# Patient Record
Sex: FEMALE | Race: WHITE | NOT HISPANIC OR LATINO | URBAN - METROPOLITAN AREA
[De-identification: names, ages, dates, MRNs, and addresses within clinical notes are randomized per-mention and may not be internally consistent; named-entity substitution may affect disease eponyms.]

---

## 2018-05-20 ENCOUNTER — EMERGENCY (EMERGENCY)
Facility: HOSPITAL | Age: 58
LOS: 0 days | Discharge: HOME | End: 2018-05-20
Attending: EMERGENCY MEDICINE | Admitting: EMERGENCY MEDICINE

## 2018-05-20 VITALS
SYSTOLIC BLOOD PRESSURE: 144 MMHG | RESPIRATION RATE: 18 BRPM | OXYGEN SATURATION: 100 % | WEIGHT: 164.91 LBS | TEMPERATURE: 97 F | HEART RATE: 80 BPM | DIASTOLIC BLOOD PRESSURE: 83 MMHG | HEIGHT: 63 IN
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
## 2018-05-20 DIAGNOSIS — Z88.8 ALLERGY STATUS TO OTHER DRUGS, MEDICAMENTS AND BIOLOGICAL SUBSTANCES: ICD-10-CM

## 2018-05-20 DIAGNOSIS — M54.5 LOW BACK PAIN: ICD-10-CM
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
## 2018-05-20 DIAGNOSIS — Z88.5 ALLERGY STATUS TO NARCOTIC AGENT: ICD-10-CM

## 2018-05-20 DIAGNOSIS — Z79.899 OTHER LONG TERM (CURRENT) DRUG THERAPY: ICD-10-CM

## 2018-05-20 DIAGNOSIS — E78.00 PURE HYPERCHOLESTEROLEMIA, UNSPECIFIED: ICD-10-CM

## 2018-05-20 DIAGNOSIS — M54.2 CERVICALGIA: ICD-10-CM
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
## 2018-05-20 RX ORDER — ESZOPICLONE 2 MG/1
1 TABLET, COATED ORAL
Qty: 0 | Refills: 0 | COMMUNITY

## 2018-05-20 RX ORDER — ROSUVASTATIN CALCIUM 5 MG/1
1 TABLET ORAL
Qty: 0 | Refills: 0 | COMMUNITY

## 2018-05-20 RX ORDER — METHOCARBAMOL 500 MG/1
1000 TABLET, FILM COATED ORAL ONCE
Qty: 0 | Refills: 0 | Status: COMPLETED | OUTPATIENT
Start: 2018-05-20 | End: 2018-05-20

## 2018-05-20 RX ORDER — IBUPROFEN 200 MG
1 TABLET ORAL
Qty: 20 | Refills: 0 | OUTPATIENT
Start: 2018-05-20 | End: 2018-05-24

## 2018-05-20 RX ORDER — KETOROLAC TROMETHAMINE 30 MG/ML
30 SYRINGE (ML) INJECTION ONCE
Qty: 0 | Refills: 0 | Status: DISCONTINUED | OUTPATIENT
Start: 2018-05-20 | End: 2018-05-20

## 2018-05-20 RX ORDER — TIZANIDINE 4 MG/1
2 TABLET ORAL
Qty: 30 | Refills: 0 | OUTPATIENT
Start: 2018-05-20 | End: 2018-05-24

## 2018-05-20 RX ORDER — ALPRAZOLAM 0.25 MG
1 TABLET ORAL
Qty: 0 | Refills: 0 | COMMUNITY

## 2018-05-20 RX ADMIN — Medication 30 MILLIGRAM(S): at 19:40

## 2018-05-20 RX ADMIN — METHOCARBAMOL 1000 MILLIGRAM(S): 500 TABLET, FILM COATED ORAL at 19:40
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
## 2018-05-20 NOTE — ED PROVIDER NOTE - PHYSICAL EXAMINATION
VITAL SIGNS: I have reviewed nursing notes and confirm.  CONSTITUTIONAL: Well-developed; well-nourished; in no acute distress.  SKIN: Skin exam is warm and dry, no acute rash.  HEAD: Normocephalic; atraumatic.  EYES: PERRL, EOM intact; conjunctiva and sclera clear.  ENT: No nasal discharge; airway clear.   NECK: Supple. No midline TTP. (+) TTP to right trapezius.   CARD: S1, S2 normal; no murmurs, gallops, or rubs. Regular rate and rhythm.  RESP: No wheezes, rales or rhonchi. Speaking in full sentences.   ABD: Normal bowel sounds; soft; non-distended; non-tender; No rebound or guarding.   BACK: No midline TTP. (+) right lumbar paraspinal TTP.   EXT: Normal ROM. No clubbing, cyanosis or edema.  NEURO: Alert, oriented. Grossly unremarkable. No focal deficits. CN II-XII intact. No dysmetria. Sensation intact throughout. Strength 5/5 throughout. Gait steady.
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
## 2019-05-31 NOTE — ED ADULT TRIAGE NOTE - AS O2 DELIVERY
room air
I have personally seen and examined this patient.  I have fully participated in the care of this patient. I have reviewed all pertinent clinical information, including history, physical exam, plan and the Resident’s note and agree except as noted.

## 2019-07-11 PROBLEM — Z00.00 ENCOUNTER FOR PREVENTIVE HEALTH EXAMINATION: Status: ACTIVE | Noted: 2019-07-11

## 2019-07-15 ENCOUNTER — APPOINTMENT (OUTPATIENT)
Dept: ORTHOPEDIC SURGERY | Facility: CLINIC | Age: 59
End: 2019-07-15
Payer: COMMERCIAL

## 2019-07-15 VITALS
BODY MASS INDEX: 31.89 KG/M2 | HEIGHT: 63 IN | WEIGHT: 180 LBS | SYSTOLIC BLOOD PRESSURE: 135 MMHG | DIASTOLIC BLOOD PRESSURE: 84 MMHG | TEMPERATURE: 98 F | HEART RATE: 80 BPM

## 2019-07-15 DIAGNOSIS — Z78.9 OTHER SPECIFIED HEALTH STATUS: ICD-10-CM

## 2019-07-15 DIAGNOSIS — Z87.39 PERSONAL HISTORY OF OTHER DISEASES OF THE MUSCULOSKELETAL SYSTEM AND CONNECTIVE TISSUE: ICD-10-CM

## 2019-07-15 DIAGNOSIS — Z87.09 PERSONAL HISTORY OF OTHER DISEASES OF THE RESPIRATORY SYSTEM: ICD-10-CM

## 2019-07-15 PROCEDURE — 73565 X-RAY EXAM OF KNEES: CPT

## 2019-07-15 PROCEDURE — 73560 X-RAY EXAM OF KNEE 1 OR 2: CPT | Mod: RT

## 2019-07-15 PROCEDURE — 20610 DRAIN/INJ JOINT/BURSA W/O US: CPT | Mod: LT

## 2019-07-19 NOTE — DISCUSSION/SUMMARY
[de-identified] : At this time, I have strongly recommended that she undergo a repeat course of viscosupplementation for the right and left knee osteoarthritis in hopes to prevent progression to a total knee arthroplasty.

## 2019-07-19 NOTE — PROCEDURE
[de-identified] : Consent: \par At this time, I have recommended an injection to the right and left knee.  The risks and benefits of the procedure were discussed with the patient in detail.  Upon verbal consent of the patient, we proceeded with the injections as noted below.  \par \par Procedure #1:  \par After a sterile prep, the patient underwent an injection of 9 cc of 1% Lidocaine without epinephrine and 1 cc of Kenalog into the right knee.  The patient tolerated the procedure well.  There were no complications. \par \par Procedure #2:  \par After a sterile prep, the patient underwent an injection of 9 cc of 1% Lidocaine without epinephrine and 1 cc of Kenalog into the left knee.  The patient tolerated the procedure well.  There were no complications.

## 2019-07-19 NOTE — PHYSICAL EXAM
[de-identified] : Left Knee: \par Knee: Range of Motion in Degrees	\par 	                  Claimant:	Normal:	\par Flexion Active	  120	                135-degrees	\par Flexion Passive	  120	                135-degrees	\par Extension Active	  10	                0-5-degrees	\par Extension Passive	  10	                0-5-degrees	\par \par No weakness to flexion/extension. No evidence of instability in the AP plane or varus or valgus stress.  Negative  Lachman.  Negative pivot shift.  Negative anterior drawer test.  Negative posterior drawer test.  Negative Arthur.  Negative Apley grind.  No medial or lateral joint line tenderness.  Positive tenderness over the medial and lateral facet of the patella.  Positive patellofemoral crepitations.  No lateral tilting patella.  No patella apprehension.  Positive crepitation in the medial and lateral femoral condyle.  No proximal or distal swelling, edema or tenderness.  No gross motor or sensory deficits. Mild intra-articular swelling.  2+ DP and PT pulses.  Moderate varus deformity.  No valgus malalignment.  Skin is intact.  No rashes, scars or lesions.\par \par Right Knee: \par Knee: Range of Motion in Degrees	\par 	                  Claimant:	Normal:	\par Flexion Active	  120	                135-degrees	\par Flexion Passive	  120	                135-degrees	\par Extension Active	  10	                0-5-degrees	\par Extension Passive	  10	                0-5-degrees	\par \par No weakness to flexion/extension. No evidence of instability in the AP plane or varus or valgus stress.  Negative  Lachman.  Negative pivot shift.  Negative anterior drawer test.  Negative posterior drawer test.  Negative Arthur.  Negative Apley grind.  No medial or lateral joint line tenderness.  Positive tenderness over the medial and lateral facet of the patella.  Positive patellofemoral crepitations.  No lateral tilting patella.  No patella apprehension.  Positive crepitation in the medial and lateral femoral condyle.  No proximal or distal swelling, edema or tenderness.  No gross motor or sensory deficits. Mild intra-articular swelling.  2+ DP and PT pulses.  Moderate varus deformity.  No valgus malalignment.  Skin is intact.  No rashes, scars or lesions.\par  [de-identified] : Ambulating with a slightly antalgic to antalgic gait.  Station:  Normal. [de-identified] : General Appearance:  Well-developed, well-nourished female in no acute distress.\par  [de-identified] : Radiographs, one view of the right knee, one view of the left knee and one view AP standing of bilateral knees, show moderate to early severe degenerative changes.

## 2019-07-19 NOTE — ADDENDUM
[FreeTextEntry1] : This note was written by Yecenia Garay on 07/18/2019 acting as scribe for Pito Puri III, MD\par  Private car
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
## 2019-07-19 NOTE — REVIEW OF SYSTEMS
[Negative] : Heme/Lymph [FreeTextEntry9] : Joint pain; joint stiffness; joint swelling  [FreeTextEntry2] : Recent weight gain

## 2019-11-21 ENCOUNTER — APPOINTMENT (OUTPATIENT)
Age: 59
End: 2019-11-21
Payer: COMMERCIAL

## 2019-11-21 VITALS
SYSTOLIC BLOOD PRESSURE: 156 MMHG | DIASTOLIC BLOOD PRESSURE: 96 MMHG | TEMPERATURE: 98.4 F | HEIGHT: 63 IN | WEIGHT: 180 LBS | HEART RATE: 105 BPM | BODY MASS INDEX: 31.89 KG/M2

## 2019-11-21 PROCEDURE — 73560 X-RAY EXAM OF KNEE 1 OR 2: CPT | Mod: RT

## 2019-11-21 PROCEDURE — 99214 OFFICE O/P EST MOD 30 MIN: CPT | Mod: 25

## 2019-11-21 PROCEDURE — 20610 DRAIN/INJ JOINT/BURSA W/O US: CPT | Mod: 59,RT

## 2019-11-26 NOTE — HISTORY OF PRESENT ILLNESS
[de-identified] : She comes in today with increasing complaints of pain to her knees.  She has been denied for viscosupplementation.

## 2019-11-26 NOTE — DISCUSSION/SUMMARY
[de-identified] : At this time I have recommended ice and elevation for the osteoarthritis, bilateral knees.  She will be reassessed in six to eight weeks.

## 2019-11-26 NOTE — ADDENDUM
[FreeTextEntry1] : This note was written by Marimar Enriquez on 11/26/2019 acting as scribe for Pito Puri III, MD

## 2019-11-26 NOTE — PHYSICAL EXAM
[de-identified] : Right knee:\par Knee: Range of Motion in Degrees	\par 	                  Claimant:	Normal:	\par Flexion Active	  120 	                135-degrees	\par Flexion Passive	  120	                135-degrees	\par Extension Active	  10	                0-5-degrees	\par Extension Passive	  10	                0-5-degrees	\par \par No weakness to flexion/extension. No evidence of instability in the AP plane or varus or valgus stress.  Negative  Lachman.  Negative pivot shift.  Negative anterior drawer test.  Negative posterior drawer test.  Negative Arthur.  Negative Apley grind.  No medial or lateral joint line tenderness.  Positive tenderness over the medial and lateral facet of the patella.  Positive patellofemoral crepitations.  No lateral tilting patella.  No patella apprehension.  Positive crepitation in the medial and lateral femoral condyle.  No proximal or distal swelling, edema or tenderness.  No gross motor or sensory deficits. Mild intra-articular swelling.  2+ DP and PT pulses. Moderate varus deformity.  Skin is intact.  No rashes, scars or lesions. \par \par Left knee:\par Knee: Range of Motion in Degrees	\par 	                  Claimant:	Normal:	\par Flexion Active	  120 	                135-degrees	\par Flexion Passive	  120	                135-degrees	\par Extension Active	  10	                0-5-degrees	\par Extension Passive	  10	                0-5-degrees	\par \par No weakness to flexion/extension. No evidence of instability in the AP plane or varus or valgus stress.  Negative  Lachman.  Negative pivot shift.  Negative anterior drawer test.  Negative posterior drawer test.  Negative Arthur.  Negative Apley grind.  No medial or lateral joint line tenderness.  Positive tenderness over the medial and lateral facet of the patella.  Positive patellofemoral crepitations.  No lateral tilting patella.  No patella apprehension.  Positive crepitation in the medial and lateral femoral condyle.  No proximal or distal swelling, edema or tenderness.  No gross motor or sensory deficits. Mild intra-articular swelling.  2+ DP and PT pulses. Moderate varus deformity.  Skin is intact.  No rashes, scars or lesions.  [de-identified] : Appearance: Well-developed, well-nourished female  in no acute distress.  [de-identified] : Gait: Slightly antalgic to antalgic gait.  Station: Normal  [de-identified] : Radiographs, one view of the right knee, one view of the left knee and one view of bilateral knees, show moderate/early severe osteoarthritis on the left and moderate osteoarthritis on the right.

## 2019-11-26 NOTE — PROCEDURE
[de-identified] : Consent: \par At this time I have recommended an injection to the  right and left knee.  The risks and benefits of the procedure were discussed with the patient in detail.  Upon verbal consent of the patient, we proceeded with the injections as noted below.  \par \par Procedure:  \par After a sterile prep, the patient underwent an injection of 9 cc of 1% Lidocaine without epinephrine and 1 cc of Kenalog into the right and left knee.  The patient tolerated the procedures well.  There were no complications.  \par \par

## 2020-01-01 ENCOUNTER — INPATIENT (INPATIENT)
Facility: HOSPITAL | Age: 60
LOS: 1 days | End: 2020-11-24
Attending: INTERNAL MEDICINE | Admitting: INTERNAL MEDICINE
Payer: COMMERCIAL

## 2020-01-01 ENCOUNTER — APPOINTMENT (OUTPATIENT)
Dept: ORTHOPEDIC SURGERY | Facility: CLINIC | Age: 60
End: 2020-01-01
Payer: COMMERCIAL

## 2020-01-01 VITALS
HEART RATE: 112 BPM | DIASTOLIC BLOOD PRESSURE: 88 MMHG | BODY MASS INDEX: 25.69 KG/M2 | TEMPERATURE: 98.4 F | WEIGHT: 145 LBS | SYSTOLIC BLOOD PRESSURE: 147 MMHG | HEIGHT: 63 IN

## 2020-01-01 VITALS — HEIGHT: 63 IN | BODY MASS INDEX: 31.01 KG/M2 | TEMPERATURE: 98.1 F | WEIGHT: 175 LBS

## 2020-01-01 VITALS — HEART RATE: 44 BPM | WEIGHT: 165.35 LBS | HEIGHT: 63 IN

## 2020-01-01 VITALS — TEMPERATURE: 95 F

## 2020-01-01 DIAGNOSIS — Z66 DO NOT RESUSCITATE: ICD-10-CM

## 2020-01-01 DIAGNOSIS — M62.82 RHABDOMYOLYSIS: ICD-10-CM

## 2020-01-01 DIAGNOSIS — R57.1 HYPOVOLEMIC SHOCK: ICD-10-CM

## 2020-01-01 DIAGNOSIS — G93.1 ANOXIC BRAIN DAMAGE, NOT ELSEWHERE CLASSIFIED: ICD-10-CM

## 2020-01-01 DIAGNOSIS — R57.0 CARDIOGENIC SHOCK: ICD-10-CM

## 2020-01-01 DIAGNOSIS — R65.21 SEVERE SEPSIS WITH SEPTIC SHOCK: ICD-10-CM

## 2020-01-01 DIAGNOSIS — K72.00 ACUTE AND SUBACUTE HEPATIC FAILURE WITHOUT COMA: ICD-10-CM

## 2020-01-01 DIAGNOSIS — E11.10 TYPE 2 DIABETES MELLITUS WITH KETOACIDOSIS WITHOUT COMA: ICD-10-CM

## 2020-01-01 DIAGNOSIS — M17.0 BILATERAL PRIMARY OSTEOARTHRITIS OF KNEE: ICD-10-CM

## 2020-01-01 DIAGNOSIS — N17.0 ACUTE KIDNEY FAILURE WITH TUBULAR NECROSIS: ICD-10-CM

## 2020-01-01 DIAGNOSIS — I21.A1 MYOCARDIAL INFARCTION TYPE 2: ICD-10-CM

## 2020-01-01 DIAGNOSIS — Z88.5 ALLERGY STATUS TO NARCOTIC AGENT: ICD-10-CM

## 2020-01-01 DIAGNOSIS — E86.1 HYPOVOLEMIA: ICD-10-CM

## 2020-01-01 DIAGNOSIS — E87.0 HYPEROSMOLALITY AND HYPERNATREMIA: ICD-10-CM

## 2020-01-01 DIAGNOSIS — I46.8 CARDIAC ARREST DUE TO OTHER UNDERLYING CONDITION: ICD-10-CM

## 2020-01-01 DIAGNOSIS — E78.00 PURE HYPERCHOLESTEROLEMIA, UNSPECIFIED: ICD-10-CM

## 2020-01-01 DIAGNOSIS — I49.01 VENTRICULAR FIBRILLATION: ICD-10-CM

## 2020-01-01 DIAGNOSIS — F41.9 ANXIETY DISORDER, UNSPECIFIED: ICD-10-CM

## 2020-01-01 DIAGNOSIS — R56.9 UNSPECIFIED CONVULSIONS: ICD-10-CM

## 2020-01-01 DIAGNOSIS — E11.649 TYPE 2 DIABETES MELLITUS WITH HYPOGLYCEMIA WITHOUT COMA: ICD-10-CM

## 2020-01-01 DIAGNOSIS — R68.0 HYPOTHERMIA, NOT ASSOCIATED WITH LOW ENVIRONMENTAL TEMPERATURE: ICD-10-CM

## 2020-01-01 DIAGNOSIS — A41.9 SEPSIS, UNSPECIFIED ORGANISM: ICD-10-CM

## 2020-01-01 DIAGNOSIS — E83.39 OTHER DISORDERS OF PHOSPHORUS METABOLISM: ICD-10-CM

## 2020-01-01 DIAGNOSIS — J96.00 ACUTE RESPIRATORY FAILURE, UNSPECIFIED WHETHER WITH HYPOXIA OR HYPERCAPNIA: ICD-10-CM

## 2020-01-01 DIAGNOSIS — E11.00 TYPE 2 DIABETES MELLITUS WITH HYPEROSMOLARITY WITHOUT NONKETOTIC HYPERGLYCEMIC-HYPEROSMOLAR COMA (NKHHC): ICD-10-CM

## 2020-01-01 DIAGNOSIS — K92.2 GASTROINTESTINAL HEMORRHAGE, UNSPECIFIED: ICD-10-CM

## 2020-01-01 LAB
ALBUMIN SERPL ELPH-MCNC: 1.4 G/DL — LOW (ref 3.5–5.2)
ALBUMIN SERPL ELPH-MCNC: 1.9 G/DL — LOW (ref 3.5–5.2)
ALBUMIN SERPL ELPH-MCNC: 2.2 G/DL — LOW (ref 3.5–5.2)
ALBUMIN SERPL ELPH-MCNC: 2.5 G/DL — LOW (ref 3.5–5.2)
ALBUMIN SERPL ELPH-MCNC: 2.6 G/DL — LOW (ref 3.5–5.2)
ALP SERPL-CCNC: 108 U/L — SIGNIFICANT CHANGE UP (ref 30–115)
ALP SERPL-CCNC: 111 U/L — SIGNIFICANT CHANGE UP (ref 30–115)
ALP SERPL-CCNC: 126 U/L — HIGH (ref 30–115)
ALP SERPL-CCNC: 173 U/L — HIGH (ref 30–115)
ALP SERPL-CCNC: 79 U/L — SIGNIFICANT CHANGE UP (ref 30–115)
ALT FLD-CCNC: 239 U/L — HIGH (ref 0–41)
ALT FLD-CCNC: 296 U/L — HIGH (ref 0–41)
ALT FLD-CCNC: 314 U/L — HIGH (ref 0–41)
ALT FLD-CCNC: 466 U/L — HIGH (ref 0–41)
ALT FLD-CCNC: >700 U/L — HIGH (ref 0–41)
ANION GAP SERPL CALC-SCNC: 23 MMOL/L — HIGH (ref 7–14)
ANION GAP SERPL CALC-SCNC: 24 MMOL/L — HIGH (ref 7–14)
ANION GAP SERPL CALC-SCNC: 27 MMOL/L — HIGH (ref 7–14)
ANION GAP SERPL CALC-SCNC: 28 MMOL/L — HIGH (ref 7–14)
ANION GAP SERPL CALC-SCNC: 28 MMOL/L — HIGH (ref 7–14)
ANION GAP SERPL CALC-SCNC: 30 MMOL/L — HIGH (ref 7–14)
APAP SERPL-MCNC: <5 UG/ML — LOW (ref 10–30)
APTT BLD: 33.5 SEC — SIGNIFICANT CHANGE UP (ref 27–39.2)
AST SERPL-CCNC: 1364 U/L — HIGH (ref 0–41)
AST SERPL-CCNC: 2170 U/L — HIGH (ref 0–41)
AST SERPL-CCNC: 365 U/L — HIGH (ref 0–41)
AST SERPL-CCNC: 5 U/L — SIGNIFICANT CHANGE UP (ref 0–41)
AST SERPL-CCNC: >700 U/L — HIGH (ref 0–41)
B-OH-BUTYR SERPL-SCNC: 0.3 MMOL/L — SIGNIFICANT CHANGE UP
BASOPHILS # BLD AUTO: 0 K/UL — SIGNIFICANT CHANGE UP (ref 0–0.2)
BASOPHILS # BLD AUTO: 0.03 K/UL — SIGNIFICANT CHANGE UP (ref 0–0.2)
BASOPHILS # BLD AUTO: 0.09 K/UL — SIGNIFICANT CHANGE UP (ref 0–0.2)
BASOPHILS NFR BLD AUTO: 0 % — SIGNIFICANT CHANGE UP (ref 0–1)
BASOPHILS NFR BLD AUTO: 0.1 % — SIGNIFICANT CHANGE UP (ref 0–1)
BASOPHILS NFR BLD AUTO: 0.4 % — SIGNIFICANT CHANGE UP (ref 0–1)
BILIRUB SERPL-MCNC: 0.4 MG/DL — SIGNIFICANT CHANGE UP (ref 0.2–1.2)
BILIRUB SERPL-MCNC: 0.9 MG/DL — SIGNIFICANT CHANGE UP (ref 0.2–1.2)
BILIRUB SERPL-MCNC: 1.1 MG/DL — SIGNIFICANT CHANGE UP (ref 0.2–1.2)
BILIRUB SERPL-MCNC: 1.8 MG/DL — HIGH (ref 0.2–1.2)
BILIRUB SERPL-MCNC: 2.5 MG/DL — HIGH (ref 0.2–1.2)
BLD GP AB SCN SERPL QL: SIGNIFICANT CHANGE UP
BUN SERPL-MCNC: 29 MG/DL — HIGH (ref 10–20)
BUN SERPL-MCNC: 30 MG/DL — HIGH (ref 10–20)
BUN SERPL-MCNC: 31 MG/DL — HIGH (ref 10–20)
BUN SERPL-MCNC: 44 MG/DL — HIGH (ref 10–20)
BUN SERPL-MCNC: 61 MG/DL — CRITICAL HIGH (ref 10–20)
BUN SERPL-MCNC: 64 MG/DL — CRITICAL HIGH (ref 10–20)
BUN SERPL-MCNC: 64 MG/DL — CRITICAL HIGH (ref 10–20)
BUN SERPL-MCNC: 66 MG/DL — CRITICAL HIGH (ref 10–20)
BUN SERPL-MCNC: 69 MG/DL — CRITICAL HIGH (ref 10–20)
BUN SERPL-MCNC: 73 MG/DL — CRITICAL HIGH (ref 10–20)
CALCIUM SERPL-MCNC: 5.5 MG/DL — CRITICAL LOW (ref 8.5–10.1)
CALCIUM SERPL-MCNC: 6 MG/DL — LOW (ref 8.5–10.1)
CALCIUM SERPL-MCNC: 6.5 MG/DL — LOW (ref 8.5–10.1)
CALCIUM SERPL-MCNC: 6.9 MG/DL — LOW (ref 8.5–10.1)
CALCIUM SERPL-MCNC: 7 MG/DL — LOW (ref 8.5–10.1)
CALCIUM SERPL-MCNC: 7.2 MG/DL — LOW (ref 8.5–10.1)
CALCIUM SERPL-MCNC: 7.7 MG/DL — LOW (ref 8.5–10.1)
CALCIUM SERPL-MCNC: 8.4 MG/DL — LOW (ref 8.5–10.1)
CALCIUM SERPL-MCNC: 8.6 MG/DL — SIGNIFICANT CHANGE UP (ref 8.5–10.1)
CALCIUM SERPL-MCNC: SIGNIFICANT CHANGE UP MG/DL (ref 8.5–10.1)
CHLORIDE SERPL-SCNC: 102 MMOL/L — SIGNIFICANT CHANGE UP (ref 98–110)
CHLORIDE SERPL-SCNC: 109 MMOL/L — SIGNIFICANT CHANGE UP (ref 98–110)
CHLORIDE SERPL-SCNC: 111 MMOL/L — HIGH (ref 98–110)
CHLORIDE SERPL-SCNC: 117 MMOL/L — HIGH (ref 98–110)
CHLORIDE SERPL-SCNC: 118 MMOL/L — HIGH (ref 98–110)
CHLORIDE SERPL-SCNC: 118 MMOL/L — HIGH (ref 98–110)
CHLORIDE SERPL-SCNC: 125 MMOL/L — HIGH (ref 98–110)
CHLORIDE SERPL-SCNC: 94 MMOL/L — LOW (ref 98–110)
CHLORIDE SERPL-SCNC: 98 MMOL/L — SIGNIFICANT CHANGE UP (ref 98–110)
CHLORIDE SERPL-SCNC: 99 MMOL/L — SIGNIFICANT CHANGE UP (ref 98–110)
CK SERPL-CCNC: 1760 U/L — HIGH (ref 0–225)
CK SERPL-CCNC: 335 U/L — HIGH (ref 0–225)
CK SERPL-CCNC: HIGH U/L (ref 0–225)
CO2 SERPL-SCNC: 10 MMOL/L — LOW (ref 17–32)
CO2 SERPL-SCNC: 11 MMOL/L — LOW (ref 17–32)
CO2 SERPL-SCNC: 13 MMOL/L — LOW (ref 17–32)
CO2 SERPL-SCNC: 5 MMOL/L — CRITICAL LOW (ref 17–32)
CO2 SERPL-SCNC: 6 MMOL/L — CRITICAL LOW (ref 17–32)
CO2 SERPL-SCNC: 7 MMOL/L — CRITICAL LOW (ref 17–32)
CO2 SERPL-SCNC: 8 MMOL/L — CRITICAL LOW (ref 17–32)
CO2 SERPL-SCNC: 8 MMOL/L — CRITICAL LOW (ref 17–32)
CO2 SERPL-SCNC: 9 MMOL/L — CRITICAL LOW (ref 17–32)
CO2 SERPL-SCNC: SIGNIFICANT CHANGE UP MMOL/L (ref 17–32)
CREAT SERPL-MCNC: 2.4 MG/DL — HIGH (ref 0.7–1.5)
CREAT SERPL-MCNC: 2.6 MG/DL — HIGH (ref 0.7–1.5)
CREAT SERPL-MCNC: 2.7 MG/DL — HIGH (ref 0.7–1.5)
CREAT SERPL-MCNC: 3.1 MG/DL — HIGH (ref 0.7–1.5)
CREAT SERPL-MCNC: 3.4 MG/DL — HIGH (ref 0.7–1.5)
CREAT SERPL-MCNC: 3.5 MG/DL — HIGH (ref 0.7–1.5)
CREAT SERPL-MCNC: 3.6 MG/DL — HIGH (ref 0.7–1.5)
CREAT SERPL-MCNC: 3.6 MG/DL — HIGH (ref 0.7–1.5)
CREAT SERPL-MCNC: 3.7 MG/DL — HIGH (ref 0.7–1.5)
CREAT SERPL-MCNC: 4 MG/DL — HIGH (ref 0.7–1.5)
CULTURE RESULTS: SIGNIFICANT CHANGE UP
D DIMER BLD IA.RAPID-MCNC: 7871 NG/ML DDU — HIGH (ref 0–230)
EOSINOPHIL # BLD AUTO: 0 K/UL — SIGNIFICANT CHANGE UP (ref 0–0.7)
EOSINOPHIL # BLD AUTO: 0.01 K/UL — SIGNIFICANT CHANGE UP (ref 0–0.7)
EOSINOPHIL NFR BLD AUTO: 0 % — SIGNIFICANT CHANGE UP (ref 0–8)
ETHANOL SERPL-MCNC: <10 MG/DL — SIGNIFICANT CHANGE UP
GLUCOSE BLDC GLUCOMTR-MCNC: 107 MG/DL — HIGH (ref 70–99)
GLUCOSE BLDC GLUCOMTR-MCNC: 109 MG/DL — HIGH (ref 70–99)
GLUCOSE BLDC GLUCOMTR-MCNC: 128 MG/DL — HIGH (ref 70–99)
GLUCOSE BLDC GLUCOMTR-MCNC: 257 MG/DL — HIGH (ref 70–99)
GLUCOSE BLDC GLUCOMTR-MCNC: 27 MG/DL — CRITICAL LOW (ref 70–99)
GLUCOSE BLDC GLUCOMTR-MCNC: 290 MG/DL — HIGH (ref 70–99)
GLUCOSE BLDC GLUCOMTR-MCNC: 442 MG/DL — HIGH (ref 70–99)
GLUCOSE BLDC GLUCOMTR-MCNC: 444 MG/DL — HIGH (ref 70–99)
GLUCOSE BLDC GLUCOMTR-MCNC: 450 MG/DL — CRITICAL HIGH (ref 70–99)
GLUCOSE BLDC GLUCOMTR-MCNC: 536 MG/DL — CRITICAL HIGH (ref 70–99)
GLUCOSE BLDC GLUCOMTR-MCNC: 54 MG/DL — CRITICAL LOW (ref 70–99)
GLUCOSE BLDC GLUCOMTR-MCNC: 561 MG/DL — CRITICAL HIGH (ref 70–99)
GLUCOSE BLDC GLUCOMTR-MCNC: 585 MG/DL — CRITICAL HIGH (ref 70–99)
GLUCOSE BLDC GLUCOMTR-MCNC: 61 MG/DL — LOW (ref 70–99)
GLUCOSE BLDC GLUCOMTR-MCNC: 62 MG/DL — LOW (ref 70–99)
GLUCOSE BLDC GLUCOMTR-MCNC: 68 MG/DL — LOW (ref 70–99)
GLUCOSE BLDC GLUCOMTR-MCNC: 71 MG/DL — SIGNIFICANT CHANGE UP (ref 70–99)
GLUCOSE BLDC GLUCOMTR-MCNC: 80 MG/DL — SIGNIFICANT CHANGE UP (ref 70–99)
GLUCOSE BLDC GLUCOMTR-MCNC: 82 MG/DL — SIGNIFICANT CHANGE UP (ref 70–99)
GLUCOSE BLDC GLUCOMTR-MCNC: 82 MG/DL — SIGNIFICANT CHANGE UP (ref 70–99)
GLUCOSE BLDC GLUCOMTR-MCNC: 91 MG/DL — SIGNIFICANT CHANGE UP (ref 70–99)
GLUCOSE BLDC GLUCOMTR-MCNC: 94 MG/DL — SIGNIFICANT CHANGE UP (ref 70–99)
GLUCOSE BLDC GLUCOMTR-MCNC: >600 MG/DL — CRITICAL HIGH (ref 70–99)
GLUCOSE SERPL-MCNC: 1069 MG/DL — CRITICAL HIGH (ref 70–99)
GLUCOSE SERPL-MCNC: 1243 MG/DL — CRITICAL HIGH (ref 70–99)
GLUCOSE SERPL-MCNC: 127 MG/DL — HIGH (ref 70–99)
GLUCOSE SERPL-MCNC: 174 MG/DL — HIGH (ref 70–99)
GLUCOSE SERPL-MCNC: 509 MG/DL — CRITICAL HIGH (ref 70–99)
GLUCOSE SERPL-MCNC: 543 MG/DL — CRITICAL HIGH (ref 70–99)
GLUCOSE SERPL-MCNC: 766 MG/DL — CRITICAL HIGH (ref 70–99)
GLUCOSE SERPL-MCNC: 824 MG/DL — CRITICAL HIGH (ref 70–99)
GLUCOSE SERPL-MCNC: >1400 MG/DL — CRITICAL HIGH (ref 70–99)
GLUCOSE SERPL-MCNC: SIGNIFICANT CHANGE UP MG/DL (ref 70–99)
HCT VFR BLD CALC: 35.7 % — LOW (ref 37–47)
HCT VFR BLD CALC: 47.1 % — HIGH (ref 37–47)
HCT VFR BLD CALC: 50.7 % — HIGH (ref 37–47)
HCT VFR BLD CALC: 51.2 % — HIGH (ref 37–47)
HCT VFR BLD CALC: 58.5 % — HIGH (ref 37–47)
HGB BLD-MCNC: 11.9 G/DL — LOW (ref 12–16)
HGB BLD-MCNC: 13.7 G/DL — SIGNIFICANT CHANGE UP (ref 12–16)
HGB BLD-MCNC: 16.4 G/DL — HIGH (ref 12–16)
HGB BLD-MCNC: 16.8 G/DL — HIGH (ref 12–16)
HGB BLD-MCNC: 18.1 G/DL — HIGH (ref 12–16)
HOROWITZ INDEX BLDA+IHG-RTO: 100 — SIGNIFICANT CHANGE UP
IMM GRANULOCYTES NFR BLD AUTO: 14.5 % — HIGH (ref 0.1–0.3)
IMM GRANULOCYTES NFR BLD AUTO: 6.4 % — HIGH (ref 0.1–0.3)
INR BLD: 1.15 RATIO — SIGNIFICANT CHANGE UP (ref 0.65–1.3)
LACTATE SERPL-SCNC: 10.5 MMOL/L — CRITICAL HIGH (ref 0.7–2)
LACTATE SERPL-SCNC: 12.3 MMOL/L — CRITICAL HIGH (ref 0.7–2)
LACTATE SERPL-SCNC: 13.2 MMOL/L — CRITICAL HIGH (ref 0.7–2)
LACTATE SERPL-SCNC: 9.5 MMOL/L — CRITICAL HIGH (ref 0.7–2)
LIDOCAIN IGE QN: 1936 U/L — HIGH (ref 7–60)
LYMPHOCYTES # BLD AUTO: 0.78 K/UL — LOW (ref 1.2–3.4)
LYMPHOCYTES # BLD AUTO: 2.06 K/UL — SIGNIFICANT CHANGE UP (ref 1.2–3.4)
LYMPHOCYTES # BLD AUTO: 3.4 % — LOW (ref 20.5–51.1)
LYMPHOCYTES # BLD AUTO: 30 % — SIGNIFICANT CHANGE UP (ref 20.5–51.1)
LYMPHOCYTES # BLD AUTO: 5.37 K/UL — HIGH (ref 1.2–3.4)
LYMPHOCYTES # BLD AUTO: 9.6 % — LOW (ref 20.5–51.1)
MAGNESIUM SERPL-MCNC: 2.3 MG/DL — SIGNIFICANT CHANGE UP (ref 1.8–2.4)
MAGNESIUM SERPL-MCNC: 2.6 MG/DL — HIGH (ref 1.8–2.4)
MAGNESIUM SERPL-MCNC: 3.1 MG/DL — CRITICAL HIGH (ref 1.8–2.4)
MAGNESIUM SERPL-MCNC: 3.4 MG/DL — CRITICAL HIGH (ref 1.8–2.4)
MAGNESIUM SERPL-MCNC: 7.5 MG/DL — CRITICAL HIGH (ref 1.8–2.4)
MCHC RBC-ENTMCNC: 26.8 G/DL — LOW (ref 32–37)
MCHC RBC-ENTMCNC: 30.2 PG — SIGNIFICANT CHANGE UP (ref 27–31)
MCHC RBC-ENTMCNC: 30.7 PG — SIGNIFICANT CHANGE UP (ref 27–31)
MCHC RBC-ENTMCNC: 30.9 G/DL — LOW (ref 32–37)
MCHC RBC-ENTMCNC: 31.1 PG — HIGH (ref 27–31)
MCHC RBC-ENTMCNC: 31.4 PG — HIGH (ref 27–31)
MCHC RBC-ENTMCNC: 31.5 PG — HIGH (ref 27–31)
MCHC RBC-ENTMCNC: 33.1 G/DL — SIGNIFICANT CHANGE UP (ref 32–37)
MCHC RBC-ENTMCNC: 33.3 G/DL — SIGNIFICANT CHANGE UP (ref 32–37)
MCHC RBC-ENTMCNC: 34.8 G/DL — SIGNIFICANT CHANGE UP (ref 32–37)
MCV RBC AUTO: 116.4 FL — HIGH (ref 81–99)
MCV RBC AUTO: 90.4 FL — SIGNIFICANT CHANGE UP (ref 81–99)
MCV RBC AUTO: 92.7 FL — SIGNIFICANT CHANGE UP (ref 81–99)
MCV RBC AUTO: 94.2 FL — SIGNIFICANT CHANGE UP (ref 81–99)
MCV RBC AUTO: 97.5 FL — SIGNIFICANT CHANGE UP (ref 81–99)
METAMYELOCYTES # FLD: 6 % — HIGH (ref 0–0)
MONOCYTES # BLD AUTO: 0.36 K/UL — SIGNIFICANT CHANGE UP (ref 0.1–0.6)
MONOCYTES # BLD AUTO: 1.29 K/UL — HIGH (ref 0.1–0.6)
MONOCYTES # BLD AUTO: 1.58 K/UL — HIGH (ref 0.1–0.6)
MONOCYTES NFR BLD AUTO: 2 % — SIGNIFICANT CHANGE UP (ref 1.7–9.3)
MONOCYTES NFR BLD AUTO: 6 % — SIGNIFICANT CHANGE UP (ref 1.7–9.3)
MONOCYTES NFR BLD AUTO: 6.9 % — SIGNIFICANT CHANGE UP (ref 1.7–9.3)
MYELOCYTES NFR BLD: 3 % — HIGH (ref 0–0)
NEUTROPHILS # BLD AUTO: 10.56 K/UL — HIGH (ref 1.4–6.5)
NEUTROPHILS # BLD AUTO: 14.88 K/UL — HIGH (ref 1.4–6.5)
NEUTROPHILS # BLD AUTO: 18.85 K/UL — HIGH (ref 1.4–6.5)
NEUTROPHILS NFR BLD AUTO: 45 % — SIGNIFICANT CHANGE UP (ref 42.2–75.2)
NEUTROPHILS NFR BLD AUTO: 69.8 % — SIGNIFICANT CHANGE UP (ref 42.2–75.2)
NEUTROPHILS NFR BLD AUTO: 82.9 % — HIGH (ref 42.2–75.2)
NEUTS BAND # BLD: 14 % — HIGH (ref 0–6)
NRBC # BLD: 0 /100 WBCS — SIGNIFICANT CHANGE UP (ref 0–0)
NRBC # BLD: 0 /100 — SIGNIFICANT CHANGE UP (ref 0–0)
NRBC # BLD: SIGNIFICANT CHANGE UP /100 WBCS (ref 0–0)
NT-PROBNP SERPL-SCNC: 1033 PG/ML — HIGH (ref 0–300)
PCO2 BLDA: 30 MMHG — LOW (ref 38–42)
PHOSPHATE SERPL-MCNC: 10.3 MG/DL — HIGH (ref 2.1–4.9)
PHOSPHATE SERPL-MCNC: 11 MG/DL — HIGH (ref 2.1–4.9)
PHOSPHATE SERPL-MCNC: 7.8 MG/DL — HIGH (ref 2.1–4.9)
PHOSPHATE SERPL-MCNC: 8.5 MG/DL — HIGH (ref 2.1–4.9)
PHOSPHATE SERPL-MCNC: 9.8 MG/DL — HIGH (ref 2.1–4.9)
PLAT MORPH BLD: NORMAL — SIGNIFICANT CHANGE UP
PLATELET # BLD AUTO: 137 K/UL — SIGNIFICANT CHANGE UP (ref 130–400)
PLATELET # BLD AUTO: 138 K/UL — SIGNIFICANT CHANGE UP (ref 130–400)
PLATELET # BLD AUTO: 176 K/UL — SIGNIFICANT CHANGE UP (ref 130–400)
PLATELET # BLD AUTO: 28 K/UL — LOW (ref 130–400)
PLATELET # BLD AUTO: 69 K/UL — LOW (ref 130–400)
PO2 BLDA: 333 MMHG — HIGH (ref 78–95)
POTASSIUM SERPL-MCNC: 3.5 MMOL/L — SIGNIFICANT CHANGE UP (ref 3.5–5)
POTASSIUM SERPL-MCNC: 3.9 MMOL/L — SIGNIFICANT CHANGE UP (ref 3.5–5)
POTASSIUM SERPL-MCNC: 4 MMOL/L — SIGNIFICANT CHANGE UP (ref 3.5–5)
POTASSIUM SERPL-MCNC: 4.1 MMOL/L — SIGNIFICANT CHANGE UP (ref 3.5–5)
POTASSIUM SERPL-MCNC: 4.8 MMOL/L — SIGNIFICANT CHANGE UP (ref 3.5–5)
POTASSIUM SERPL-MCNC: 5.6 MMOL/L — HIGH (ref 3.5–5)
POTASSIUM SERPL-MCNC: 6.3 MMOL/L — CRITICAL HIGH (ref 3.5–5)
POTASSIUM SERPL-MCNC: 7.3 MMOL/L — CRITICAL HIGH (ref 3.5–5)
POTASSIUM SERPL-MCNC: 8.4 MMOL/L — CRITICAL HIGH (ref 3.5–5)
POTASSIUM SERPL-MCNC: SIGNIFICANT CHANGE UP MMOL/L (ref 3.5–5)
POTASSIUM SERPL-SCNC: 3.5 MMOL/L — SIGNIFICANT CHANGE UP (ref 3.5–5)
POTASSIUM SERPL-SCNC: 3.9 MMOL/L — SIGNIFICANT CHANGE UP (ref 3.5–5)
POTASSIUM SERPL-SCNC: 4 MMOL/L — SIGNIFICANT CHANGE UP (ref 3.5–5)
POTASSIUM SERPL-SCNC: 4.1 MMOL/L — SIGNIFICANT CHANGE UP (ref 3.5–5)
POTASSIUM SERPL-SCNC: 4.8 MMOL/L — SIGNIFICANT CHANGE UP (ref 3.5–5)
POTASSIUM SERPL-SCNC: 5.6 MMOL/L — HIGH (ref 3.5–5)
POTASSIUM SERPL-SCNC: 6.3 MMOL/L — CRITICAL HIGH (ref 3.5–5)
POTASSIUM SERPL-SCNC: 7.3 MMOL/L — CRITICAL HIGH (ref 3.5–5)
POTASSIUM SERPL-SCNC: 8.4 MMOL/L — CRITICAL HIGH (ref 3.5–5)
POTASSIUM SERPL-SCNC: SIGNIFICANT CHANGE UP MMOL/L (ref 3.5–5)
PROT SERPL-MCNC: 2.8 G/DL — LOW (ref 6–8)
PROT SERPL-MCNC: 3.7 G/DL — LOW (ref 6–8)
PROT SERPL-MCNC: 4.3 G/DL — LOW (ref 6–8)
PROT SERPL-MCNC: 4.6 G/DL — LOW (ref 6–8)
PROT SERPL-MCNC: 4.7 G/DL — LOW (ref 6–8)
PROTHROM AB SERPL-ACNC: 13.2 SEC — HIGH (ref 9.95–12.87)
RAPID RVP RESULT: SIGNIFICANT CHANGE UP
RBC # BLD: 3.79 M/UL — LOW (ref 4.2–5.4)
RBC # BLD: 4.4 M/UL — SIGNIFICANT CHANGE UP (ref 4.2–5.4)
RBC # BLD: 5.21 M/UL — SIGNIFICANT CHANGE UP (ref 4.2–5.4)
RBC # BLD: 5.47 M/UL — HIGH (ref 4.2–5.4)
RBC # BLD: 6 M/UL — HIGH (ref 4.2–5.4)
RBC # FLD: 13.8 % — SIGNIFICANT CHANGE UP (ref 11.5–14.5)
RBC # FLD: 13.9 % — SIGNIFICANT CHANGE UP (ref 11.5–14.5)
RBC # FLD: 14.2 % — SIGNIFICANT CHANGE UP (ref 11.5–14.5)
RBC # FLD: 14.6 % — HIGH (ref 11.5–14.5)
RBC # FLD: 15 % — HIGH (ref 11.5–14.5)
RBC BLD AUTO: NORMAL — SIGNIFICANT CHANGE UP
SALICYLATES SERPL-MCNC: <0.3 MG/DL — LOW (ref 4–30)
SAO2 % BLDA: 99 % — HIGH (ref 94–98)
SARS-COV-2 IGG SERPL QL IA: NEGATIVE — SIGNIFICANT CHANGE UP
SARS-COV-2 IGM SERPL IA-ACNC: <0.1 INDEX — SIGNIFICANT CHANGE UP
SARS-COV-2 RNA SPEC QL NAA+PROBE: SIGNIFICANT CHANGE UP
SODIUM SERPL-SCNC: 132 MMOL/L — LOW (ref 135–146)
SODIUM SERPL-SCNC: 133 MMOL/L — LOW (ref 135–146)
SODIUM SERPL-SCNC: 136 MMOL/L — SIGNIFICANT CHANGE UP (ref 135–146)
SODIUM SERPL-SCNC: 136 MMOL/L — SIGNIFICANT CHANGE UP (ref 135–146)
SODIUM SERPL-SCNC: 147 MMOL/L — HIGH (ref 135–146)
SODIUM SERPL-SCNC: 149 MMOL/L — HIGH (ref 135–146)
SODIUM SERPL-SCNC: 151 MMOL/L — HIGH (ref 135–146)
SODIUM SERPL-SCNC: 151 MMOL/L — HIGH (ref 135–146)
SODIUM SERPL-SCNC: 158 MMOL/L — HIGH (ref 135–146)
SODIUM SERPL-SCNC: 160 MMOL/L — HIGH (ref 135–146)
SPECIMEN SOURCE: SIGNIFICANT CHANGE UP
TROPONIN T SERPL-MCNC: 0.67 NG/ML — CRITICAL HIGH
TROPONIN T SERPL-MCNC: <0.01 NG/ML — SIGNIFICANT CHANGE UP
VANCOMYCIN TROUGH SERPL-MCNC: <4 UG/ML — LOW (ref 5–10)
WBC # BLD: 17.89 K/UL — HIGH (ref 4.8–10.8)
WBC # BLD: 20.67 K/UL — HIGH (ref 4.8–10.8)
WBC # BLD: 21.38 K/UL — HIGH (ref 4.8–10.8)
WBC # BLD: 22.75 K/UL — HIGH (ref 4.8–10.8)
WBC # BLD: 23.4 K/UL — HIGH (ref 4.8–10.8)
WBC # FLD AUTO: 17.89 K/UL — HIGH (ref 4.8–10.8)
WBC # FLD AUTO: 20.67 K/UL — HIGH (ref 4.8–10.8)
WBC # FLD AUTO: 21.38 K/UL — HIGH (ref 4.8–10.8)
WBC # FLD AUTO: 22.75 K/UL — HIGH (ref 4.8–10.8)
WBC # FLD AUTO: 23.4 K/UL — HIGH (ref 4.8–10.8)

## 2020-01-01 PROCEDURE — 73565 X-RAY EXAM OF KNEES: CPT

## 2020-01-01 PROCEDURE — 71045 X-RAY EXAM CHEST 1 VIEW: CPT | Mod: 26

## 2020-01-01 PROCEDURE — 20610 DRAIN/INJ JOINT/BURSA W/O US: CPT | Mod: LT

## 2020-01-01 PROCEDURE — 36556 INSERT NON-TUNNEL CV CATH: CPT | Mod: 59

## 2020-01-01 PROCEDURE — 93970 EXTREMITY STUDY: CPT | Mod: 26

## 2020-01-01 PROCEDURE — 31500 INSERT EMERGENCY AIRWAY: CPT

## 2020-01-01 PROCEDURE — 99214 OFFICE O/P EST MOD 30 MIN: CPT | Mod: 25

## 2020-01-01 PROCEDURE — 99233 SBSQ HOSP IP/OBS HIGH 50: CPT | Mod: 25

## 2020-01-01 PROCEDURE — 76775 US EXAM ABDO BACK WALL LIM: CPT | Mod: 26

## 2020-01-01 PROCEDURE — 99222 1ST HOSP IP/OBS MODERATE 55: CPT

## 2020-01-01 PROCEDURE — 73560 X-RAY EXAM OF KNEE 1 OR 2: CPT | Mod: LT

## 2020-01-01 PROCEDURE — 99239 HOSP IP/OBS DSCHRG MGMT >30: CPT

## 2020-01-01 PROCEDURE — 99291 CRITICAL CARE FIRST HOUR: CPT | Mod: 25

## 2020-01-01 PROCEDURE — 20610 DRAIN/INJ JOINT/BURSA W/O US: CPT | Mod: RT

## 2020-01-01 RX ORDER — GLUCAGON INJECTION, SOLUTION 0.5 MG/.1ML
1 INJECTION, SOLUTION SUBCUTANEOUS ONCE
Refills: 0 | Status: COMPLETED | OUTPATIENT
Start: 2020-01-01 | End: 2020-01-01

## 2020-01-01 RX ORDER — HYDROCORTISONE 20 MG
50 TABLET ORAL EVERY 8 HOURS
Refills: 0 | Status: DISCONTINUED | OUTPATIENT
Start: 2020-01-01 | End: 2020-01-01

## 2020-01-01 RX ORDER — INSULIN HUMAN 100 [IU]/ML
7 INJECTION, SOLUTION SUBCUTANEOUS
Qty: 100 | Refills: 0 | Status: DISCONTINUED | OUTPATIENT
Start: 2020-01-01 | End: 2020-01-01

## 2020-01-01 RX ORDER — SODIUM CHLORIDE 9 MG/ML
1000 INJECTION INTRAMUSCULAR; INTRAVENOUS; SUBCUTANEOUS ONCE
Refills: 0 | Status: DISCONTINUED | OUTPATIENT
Start: 2020-01-01 | End: 2020-01-01

## 2020-01-01 RX ORDER — DEXTROSE 50 % IN WATER 50 %
100 SYRINGE (ML) INTRAVENOUS
Refills: 0 | Status: DISCONTINUED | OUTPATIENT
Start: 2020-01-01 | End: 2020-01-01

## 2020-01-01 RX ORDER — SODIUM CHLORIDE 9 MG/ML
1000 INJECTION INTRAMUSCULAR; INTRAVENOUS; SUBCUTANEOUS
Refills: 0 | Status: DISCONTINUED | OUTPATIENT
Start: 2020-01-01 | End: 2020-01-01

## 2020-01-01 RX ORDER — PANTOPRAZOLE SODIUM 20 MG/1
40 TABLET, DELAYED RELEASE ORAL
Refills: 0 | Status: DISCONTINUED | OUTPATIENT
Start: 2020-01-01 | End: 2020-01-01

## 2020-01-01 RX ORDER — PANTOPRAZOLE SODIUM 20 MG/1
40 TABLET, DELAYED RELEASE ORAL ONCE
Refills: 0 | Status: COMPLETED | OUTPATIENT
Start: 2020-01-01 | End: 2020-01-01

## 2020-01-01 RX ORDER — INSULIN HUMAN 100 [IU]/ML
15 INJECTION, SOLUTION SUBCUTANEOUS ONCE
Refills: 0 | Status: COMPLETED | OUTPATIENT
Start: 2020-01-01 | End: 2020-01-01

## 2020-01-01 RX ORDER — NOREPINEPHRINE BITARTRATE/D5W 8 MG/250ML
0.05 PLASTIC BAG, INJECTION (ML) INTRAVENOUS
Qty: 16 | Refills: 0 | Status: DISCONTINUED | OUTPATIENT
Start: 2020-01-01 | End: 2020-01-01

## 2020-01-01 RX ORDER — NOREPINEPHRINE BITARTRATE/D5W 8 MG/250ML
0.05 PLASTIC BAG, INJECTION (ML) INTRAVENOUS
Qty: 8 | Refills: 0 | Status: DISCONTINUED | OUTPATIENT
Start: 2020-01-01 | End: 2020-01-01

## 2020-01-01 RX ORDER — SODIUM BICARBONATE 1 MEQ/ML
0.16 SYRINGE (ML) INTRAVENOUS
Qty: 150 | Refills: 0 | Status: DISCONTINUED | OUTPATIENT
Start: 2020-01-01 | End: 2020-01-01

## 2020-01-01 RX ORDER — SODIUM CHLORIDE 9 MG/ML
1000 INJECTION, SOLUTION INTRAVENOUS
Refills: 0 | Status: DISCONTINUED | OUTPATIENT
Start: 2020-01-01 | End: 2020-01-01

## 2020-01-01 RX ORDER — DEXTROSE 50 % IN WATER 50 %
50 SYRINGE (ML) INTRAVENOUS ONCE
Refills: 0 | Status: COMPLETED | OUTPATIENT
Start: 2020-01-01 | End: 2020-01-01

## 2020-01-01 RX ORDER — SODIUM CHLORIDE 9 MG/ML
1 INJECTION INTRAMUSCULAR; INTRAVENOUS; SUBCUTANEOUS ONCE
Refills: 0 | Status: DISCONTINUED | OUTPATIENT
Start: 2020-01-01 | End: 2020-01-01

## 2020-01-01 RX ORDER — SODIUM CHLORIDE 9 MG/ML
2000 INJECTION INTRAMUSCULAR; INTRAVENOUS; SUBCUTANEOUS ONCE
Refills: 0 | Status: COMPLETED | OUTPATIENT
Start: 2020-01-01 | End: 2020-01-01

## 2020-01-01 RX ORDER — POTASSIUM CHLORIDE 20 MEQ
20 PACKET (EA) ORAL ONCE
Refills: 0 | Status: COMPLETED | OUTPATIENT
Start: 2020-01-01 | End: 2020-01-01

## 2020-01-01 RX ORDER — DEXTROSE 50 % IN WATER 50 %
50 SYRINGE (ML) INTRAVENOUS ONCE
Refills: 0 | Status: DISCONTINUED | OUTPATIENT
Start: 2020-01-01 | End: 2020-01-01

## 2020-01-01 RX ORDER — SODIUM BICARBONATE 1 MEQ/ML
50 SYRINGE (ML) INTRAVENOUS ONCE
Refills: 0 | Status: COMPLETED | OUTPATIENT
Start: 2020-01-01 | End: 2020-01-01

## 2020-01-01 RX ORDER — INSULIN HUMAN 100 [IU]/ML
14 INJECTION, SOLUTION SUBCUTANEOUS
Qty: 100 | Refills: 0 | Status: DISCONTINUED | OUTPATIENT
Start: 2020-01-01 | End: 2020-01-01

## 2020-01-01 RX ORDER — DEXTROSE 50 % IN WATER 50 %
50 SYRINGE (ML) INTRAVENOUS
Refills: 0 | Status: COMPLETED | OUTPATIENT
Start: 2020-01-01 | End: 2020-01-01

## 2020-01-01 RX ORDER — CHLORHEXIDINE GLUCONATE 213 G/1000ML
1 SOLUTION TOPICAL
Refills: 0 | Status: DISCONTINUED | OUTPATIENT
Start: 2020-01-01 | End: 2020-01-01

## 2020-01-01 RX ORDER — INSULIN HUMAN 100 [IU]/ML
7 INJECTION, SOLUTION SUBCUTANEOUS ONCE
Refills: 0 | Status: COMPLETED | OUTPATIENT
Start: 2020-01-01 | End: 2020-01-01

## 2020-01-01 RX ORDER — DEXTROSE 50 % IN WATER 50 %
100 SYRINGE (ML) INTRAVENOUS ONCE
Refills: 0 | Status: COMPLETED | OUTPATIENT
Start: 2020-01-01 | End: 2020-01-01

## 2020-01-01 RX ORDER — DOPAMINE HYDROCHLORIDE 40 MG/ML
2 INJECTION, SOLUTION, CONCENTRATE INTRAVENOUS
Qty: 400 | Refills: 0 | Status: DISCONTINUED | OUTPATIENT
Start: 2020-01-01 | End: 2020-01-01

## 2020-01-01 RX ORDER — HYDROCORTISONE 20 MG
100 TABLET ORAL EVERY 8 HOURS
Refills: 0 | Status: DISCONTINUED | OUTPATIENT
Start: 2020-01-01 | End: 2020-01-01

## 2020-01-01 RX ORDER — POTASSIUM CHLORIDE 20 MEQ
20 PACKET (EA) ORAL ONCE
Refills: 0 | Status: DISCONTINUED | OUTPATIENT
Start: 2020-01-01 | End: 2020-01-01

## 2020-01-01 RX ORDER — INSULIN HUMAN 100 [IU]/ML
20 INJECTION, SOLUTION SUBCUTANEOUS
Qty: 100 | Refills: 0 | Status: DISCONTINUED | OUTPATIENT
Start: 2020-01-01 | End: 2020-01-01

## 2020-01-01 RX ORDER — MEROPENEM 1 G/30ML
500 INJECTION INTRAVENOUS EVERY 12 HOURS
Refills: 0 | Status: DISCONTINUED | OUTPATIENT
Start: 2020-01-01 | End: 2020-01-01

## 2020-01-01 RX ORDER — VANCOMYCIN HCL 1 G
1500 VIAL (EA) INTRAVENOUS ONCE
Refills: 0 | Status: COMPLETED | OUTPATIENT
Start: 2020-01-01 | End: 2020-01-01

## 2020-01-01 RX ORDER — MIDAZOLAM HYDROCHLORIDE 1 MG/ML
0.02 INJECTION, SOLUTION INTRAMUSCULAR; INTRAVENOUS
Qty: 100 | Refills: 0 | Status: DISCONTINUED | OUTPATIENT
Start: 2020-01-01 | End: 2020-01-01

## 2020-01-01 RX ORDER — DOPAMINE HYDROCHLORIDE 40 MG/ML
2 INJECTION, SOLUTION, CONCENTRATE INTRAVENOUS
Qty: 800 | Refills: 0 | Status: DISCONTINUED | OUTPATIENT
Start: 2020-01-01 | End: 2020-01-01

## 2020-01-01 RX ORDER — CHLORHEXIDINE GLUCONATE 213 G/1000ML
15 SOLUTION TOPICAL
Refills: 0 | Status: DISCONTINUED | OUTPATIENT
Start: 2020-01-01 | End: 2020-01-01

## 2020-01-01 RX ORDER — DEXTROSE 10 % IN WATER 10 %
1000 INTRAVENOUS SOLUTION INTRAVENOUS
Refills: 0 | Status: DISCONTINUED | OUTPATIENT
Start: 2020-01-01 | End: 2020-01-01

## 2020-01-01 RX ORDER — EPINEPHRINE 0.3 MG/.3ML
0.1 INJECTION INTRAMUSCULAR; SUBCUTANEOUS
Qty: 4 | Refills: 0 | Status: DISCONTINUED | OUTPATIENT
Start: 2020-01-01 | End: 2020-01-01

## 2020-01-01 RX ORDER — SODIUM CHLORIDE 9 MG/ML
1000 INJECTION, SOLUTION INTRAVENOUS ONCE
Refills: 0 | Status: DISCONTINUED | OUTPATIENT
Start: 2020-01-01 | End: 2020-01-01

## 2020-01-01 RX ORDER — VASOPRESSIN 20 [USP'U]/ML
0.04 INJECTION INTRAVENOUS
Qty: 50 | Refills: 0 | Status: DISCONTINUED | OUTPATIENT
Start: 2020-01-01 | End: 2020-01-01

## 2020-01-01 RX ORDER — INSULIN HUMAN 100 [IU]/ML
10 INJECTION, SOLUTION SUBCUTANEOUS
Qty: 100 | Refills: 0 | Status: DISCONTINUED | OUTPATIENT
Start: 2020-01-01 | End: 2020-01-01

## 2020-01-01 RX ORDER — SODIUM CHLORIDE 9 MG/ML
1000 INJECTION INTRAMUSCULAR; INTRAVENOUS; SUBCUTANEOUS ONCE
Refills: 0 | Status: COMPLETED | OUTPATIENT
Start: 2020-01-01 | End: 2020-01-01

## 2020-01-01 RX ORDER — INSULIN HUMAN 100 [IU]/ML
15 INJECTION, SOLUTION SUBCUTANEOUS
Qty: 100 | Refills: 0 | Status: DISCONTINUED | OUTPATIENT
Start: 2020-01-01 | End: 2020-01-01

## 2020-01-01 RX ORDER — PHENYLEPHRINE HYDROCHLORIDE 10 MG/ML
0.1 INJECTION INTRAVENOUS
Qty: 160 | Refills: 0 | Status: DISCONTINUED | OUTPATIENT
Start: 2020-01-01 | End: 2020-01-01

## 2020-01-01 RX ORDER — CALCIUM CHLORIDE
1000 POWDER (GRAM) MISCELLANEOUS ONCE
Refills: 0 | Status: COMPLETED | OUTPATIENT
Start: 2020-01-01 | End: 2020-01-01

## 2020-01-01 RX ORDER — SODIUM CHLORIDE 9 MG/ML
250 INJECTION, SOLUTION INTRAVENOUS ONCE
Refills: 0 | Status: COMPLETED | OUTPATIENT
Start: 2020-01-01 | End: 2020-01-01

## 2020-01-01 RX ORDER — ACETAMINOPHEN 500 MG
650 TABLET ORAL EVERY 4 HOURS
Refills: 0 | Status: DISCONTINUED | OUTPATIENT
Start: 2020-01-01 | End: 2020-01-01

## 2020-01-01 RX ORDER — CEFEPIME 1 G/1
1000 INJECTION, POWDER, FOR SOLUTION INTRAMUSCULAR; INTRAVENOUS ONCE
Refills: 0 | Status: COMPLETED | OUTPATIENT
Start: 2020-01-01 | End: 2020-01-01

## 2020-01-01 RX ORDER — INSULIN HUMAN 100 [IU]/ML
1 INJECTION, SOLUTION SUBCUTANEOUS
Qty: 100 | Refills: 0 | Status: DISCONTINUED | OUTPATIENT
Start: 2020-01-01 | End: 2020-01-01

## 2020-01-01 RX ORDER — VASOPRESSIN 20 [USP'U]/ML
0.67 INJECTION INTRAVENOUS
Qty: 50 | Refills: 0 | Status: DISCONTINUED | OUTPATIENT
Start: 2020-01-01 | End: 2020-01-01

## 2020-01-01 RX ORDER — DOPAMINE HYDROCHLORIDE 40 MG/ML
35 INJECTION, SOLUTION, CONCENTRATE INTRAVENOUS
Qty: 400 | Refills: 0 | Status: DISCONTINUED | OUTPATIENT
Start: 2020-01-01 | End: 2020-01-01

## 2020-01-01 RX ORDER — NOREPINEPHRINE BITARTRATE/D5W 8 MG/250ML
0.06 PLASTIC BAG, INJECTION (ML) INTRAVENOUS
Qty: 16 | Refills: 0 | Status: DISCONTINUED | OUTPATIENT
Start: 2020-01-01 | End: 2020-01-01

## 2020-01-01 RX ADMIN — SODIUM CHLORIDE 250 MILLILITER(S): 9 INJECTION, SOLUTION INTRAVENOUS at 05:57

## 2020-01-01 RX ADMIN — Medication 3.52 MICROGRAM(S)/KG/MIN: at 15:29

## 2020-01-01 RX ADMIN — MIDAZOLAM HYDROCHLORIDE 1.43 MG/KG/HR: 1 INJECTION, SOLUTION INTRAMUSCULAR; INTRAVENOUS at 23:40

## 2020-01-01 RX ADMIN — EPINEPHRINE 28.1 MICROGRAM(S)/KG/MIN: 0.3 INJECTION INTRAMUSCULAR; SUBCUTANEOUS at 15:27

## 2020-01-01 RX ADMIN — INSULIN HUMAN 10 UNIT(S)/HR: 100 INJECTION, SOLUTION SUBCUTANEOUS at 20:46

## 2020-01-01 RX ADMIN — DOPAMINE HYDROCHLORIDE 93.8 MICROGRAM(S)/KG/MIN: 40 INJECTION, SOLUTION, CONCENTRATE INTRAVENOUS at 22:06

## 2020-01-01 RX ADMIN — Medication 75 MEQ/KG/HR: at 18:57

## 2020-01-01 RX ADMIN — PANTOPRAZOLE SODIUM 40 MILLIGRAM(S): 20 TABLET, DELAYED RELEASE ORAL at 05:19

## 2020-01-01 RX ADMIN — Medication 3.35 MICROGRAM(S)/KG/MIN: at 09:02

## 2020-01-01 RX ADMIN — Medication 50 MILLIEQUIVALENT(S): at 04:42

## 2020-01-01 RX ADMIN — VASOPRESSIN 2.4 UNIT(S)/MIN: 20 INJECTION INTRAVENOUS at 20:41

## 2020-01-01 RX ADMIN — Medication 100 MILLILITER(S): at 07:00

## 2020-01-01 RX ADMIN — INSULIN HUMAN 20 UNIT(S)/HR: 100 INJECTION, SOLUTION SUBCUTANEOUS at 07:52

## 2020-01-01 RX ADMIN — Medication 3.52 MICROGRAM(S)/KG/MIN: at 08:00

## 2020-01-01 RX ADMIN — PHENYLEPHRINE HYDROCHLORIDE 1.34 MICROGRAM(S)/KG/MIN: 10 INJECTION INTRAVENOUS at 19:35

## 2020-01-01 RX ADMIN — CHLORHEXIDINE GLUCONATE 15 MILLILITER(S): 213 SOLUTION TOPICAL at 17:43

## 2020-01-01 RX ADMIN — PHENYLEPHRINE HYDROCHLORIDE 1.34 MICROGRAM(S)/KG/MIN: 10 INJECTION INTRAVENOUS at 08:00

## 2020-01-01 RX ADMIN — Medication 50 MILLIEQUIVALENT(S): at 19:50

## 2020-01-01 RX ADMIN — VASOPRESSIN 2.4 UNIT(S)/MIN: 20 INJECTION INTRAVENOUS at 20:45

## 2020-01-01 RX ADMIN — SODIUM CHLORIDE 500 MILLILITER(S): 9 INJECTION, SOLUTION INTRAVENOUS at 19:17

## 2020-01-01 RX ADMIN — SODIUM CHLORIDE 2000 MILLILITER(S): 9 INJECTION INTRAMUSCULAR; INTRAVENOUS; SUBCUTANEOUS at 13:50

## 2020-01-01 RX ADMIN — MEROPENEM 100 MILLIGRAM(S): 1 INJECTION INTRAVENOUS at 19:01

## 2020-01-01 RX ADMIN — INSULIN HUMAN 20 UNIT(S)/HR: 100 INJECTION, SOLUTION SUBCUTANEOUS at 01:18

## 2020-01-01 RX ADMIN — PANTOPRAZOLE SODIUM 40 MILLIGRAM(S): 20 TABLET, DELAYED RELEASE ORAL at 17:42

## 2020-01-01 RX ADMIN — Medication 1000 MILLIGRAM(S): at 01:23

## 2020-01-01 RX ADMIN — INSULIN HUMAN 10 UNIT(S)/HR: 100 INJECTION, SOLUTION SUBCUTANEOUS at 11:16

## 2020-01-01 RX ADMIN — Medication 100 MILLILITER(S): at 06:05

## 2020-01-01 RX ADMIN — CHLORHEXIDINE GLUCONATE 15 MILLILITER(S): 213 SOLUTION TOPICAL at 05:16

## 2020-01-01 RX ADMIN — GLUCAGON INJECTION, SOLUTION 1 MILLIGRAM(S): 0.5 INJECTION, SOLUTION SUBCUTANEOUS at 04:09

## 2020-01-01 RX ADMIN — SODIUM CHLORIDE 75 MILLILITER(S): 9 INJECTION, SOLUTION INTRAVENOUS at 17:50

## 2020-01-01 RX ADMIN — Medication 50 MILLILITER(S): at 07:06

## 2020-01-01 RX ADMIN — Medication 75 MEQ/KG/HR: at 12:03

## 2020-01-01 RX ADMIN — Medication 3.52 MICROGRAM(S)/KG/MIN: at 11:47

## 2020-01-01 RX ADMIN — DOPAMINE HYDROCHLORIDE 5.63 MICROGRAM(S)/KG/MIN: 40 INJECTION, SOLUTION, CONCENTRATE INTRAVENOUS at 18:59

## 2020-01-01 RX ADMIN — SODIUM CHLORIDE 75 MILLILITER(S): 9 INJECTION, SOLUTION INTRAVENOUS at 08:00

## 2020-01-01 RX ADMIN — Medication 50 MILLILITER(S): at 01:16

## 2020-01-01 RX ADMIN — Medication 100 MILLILITER(S): at 01:17

## 2020-01-01 RX ADMIN — SODIUM CHLORIDE 2000 MILLILITER(S): 9 INJECTION INTRAMUSCULAR; INTRAVENOUS; SUBCUTANEOUS at 15:29

## 2020-01-01 RX ADMIN — INSULIN HUMAN 7 UNIT(S)/HR: 100 INJECTION, SOLUTION SUBCUTANEOUS at 15:27

## 2020-01-01 RX ADMIN — Medication 3.35 MICROGRAM(S)/KG/MIN: at 17:43

## 2020-01-01 RX ADMIN — Medication 3.52 MICROGRAM(S)/KG/MIN: at 02:02

## 2020-01-01 RX ADMIN — Medication 3.52 MICROGRAM(S)/KG/MIN: at 05:58

## 2020-01-01 RX ADMIN — Medication 3.35 MICROGRAM(S)/KG/MIN: at 10:30

## 2020-01-01 RX ADMIN — DOPAMINE HYDROCHLORIDE 5.63 MICROGRAM(S)/KG/MIN: 40 INJECTION, SOLUTION, CONCENTRATE INTRAVENOUS at 15:26

## 2020-01-01 RX ADMIN — Medication 650 MILLIGRAM(S): at 03:00

## 2020-01-01 RX ADMIN — Medication 100 MILLILITER(S): at 05:22

## 2020-01-01 RX ADMIN — CEFEPIME 100 MILLIGRAM(S): 1 INJECTION, POWDER, FOR SOLUTION INTRAMUSCULAR; INTRAVENOUS at 17:00

## 2020-01-01 RX ADMIN — INSULIN HUMAN 10 UNIT(S)/HR: 100 INJECTION, SOLUTION SUBCUTANEOUS at 20:38

## 2020-01-01 RX ADMIN — Medication 3.52 MICROGRAM(S)/KG/MIN: at 07:06

## 2020-01-01 RX ADMIN — DOPAMINE HYDROCHLORIDE 93.8 MICROGRAM(S)/KG/MIN: 40 INJECTION, SOLUTION, CONCENTRATE INTRAVENOUS at 18:52

## 2020-01-01 RX ADMIN — SODIUM CHLORIDE 2000 MILLILITER(S): 9 INJECTION INTRAMUSCULAR; INTRAVENOUS; SUBCUTANEOUS at 16:43

## 2020-01-01 RX ADMIN — SODIUM CHLORIDE 100 MILLILITER(S): 9 INJECTION, SOLUTION INTRAVENOUS at 21:08

## 2020-01-01 RX ADMIN — INSULIN HUMAN 15 UNIT(S): 100 INJECTION, SOLUTION SUBCUTANEOUS at 01:00

## 2020-01-01 RX ADMIN — Medication 50 MILLIEQUIVALENT(S): at 17:27

## 2020-01-01 RX ADMIN — Medication 100 MILLILITER(S): at 04:09

## 2020-01-01 RX ADMIN — MEROPENEM 100 MILLIGRAM(S): 1 INJECTION INTRAVENOUS at 05:42

## 2020-01-01 RX ADMIN — PANTOPRAZOLE SODIUM 40 MILLIGRAM(S): 20 TABLET, DELAYED RELEASE ORAL at 05:53

## 2020-01-01 RX ADMIN — SODIUM CHLORIDE 250 MILLILITER(S): 9 INJECTION INTRAMUSCULAR; INTRAVENOUS; SUBCUTANEOUS at 23:41

## 2020-01-01 RX ADMIN — SODIUM CHLORIDE 75 MILLILITER(S): 9 INJECTION, SOLUTION INTRAVENOUS at 01:41

## 2020-01-01 RX ADMIN — Medication 50 MILLIGRAM(S): at 05:19

## 2020-01-01 RX ADMIN — Medication 3.52 MICROGRAM(S)/KG/MIN: at 20:00

## 2020-01-01 RX ADMIN — CHLORHEXIDINE GLUCONATE 1 APPLICATION(S): 213 SOLUTION TOPICAL at 05:16

## 2020-01-01 RX ADMIN — Medication 50 MILLIGRAM(S): at 20:41

## 2020-01-01 RX ADMIN — INSULIN HUMAN 15 UNIT(S): 100 INJECTION, SOLUTION SUBCUTANEOUS at 22:45

## 2020-01-01 RX ADMIN — DOPAMINE HYDROCHLORIDE 93.8 MICROGRAM(S)/KG/MIN: 40 INJECTION, SOLUTION, CONCENTRATE INTRAVENOUS at 21:47

## 2020-01-01 RX ADMIN — SODIUM CHLORIDE 150 MILLILITER(S): 9 INJECTION INTRAMUSCULAR; INTRAVENOUS; SUBCUTANEOUS at 22:08

## 2020-01-01 RX ADMIN — Medication 100 MILLIEQUIVALENT(S): at 19:06

## 2020-01-01 RX ADMIN — Medication 3.91 MICROGRAM(S)/KG/MIN: at 05:22

## 2020-01-01 RX ADMIN — Medication 75 MEQ/KG/HR: at 00:34

## 2020-01-01 RX ADMIN — Medication 3.52 MICROGRAM(S)/KG/MIN: at 19:09

## 2020-01-01 RX ADMIN — Medication 2 MILLIGRAM(S): at 14:45

## 2020-01-01 RX ADMIN — Medication 50 MILLIGRAM(S): at 14:25

## 2020-01-01 RX ADMIN — Medication 50 MILLIGRAM(S): at 21:26

## 2020-01-01 RX ADMIN — PANTOPRAZOLE SODIUM 40 MILLIGRAM(S): 20 TABLET, DELAYED RELEASE ORAL at 17:04

## 2020-01-01 RX ADMIN — Medication 50 MILLIGRAM(S): at 05:41

## 2020-01-01 RX ADMIN — MEROPENEM 100 MILLIGRAM(S): 1 INJECTION INTRAVENOUS at 18:16

## 2020-01-01 RX ADMIN — DOPAMINE HYDROCHLORIDE 93.8 MICROGRAM(S)/KG/MIN: 40 INJECTION, SOLUTION, CONCENTRATE INTRAVENOUS at 10:00

## 2020-01-01 RX ADMIN — CHLORHEXIDINE GLUCONATE 15 MILLILITER(S): 213 SOLUTION TOPICAL at 05:22

## 2020-01-01 RX ADMIN — Medication 650 MILLIGRAM(S): at 02:15

## 2020-01-01 RX ADMIN — SODIUM CHLORIDE 100 MILLILITER(S): 9 INJECTION, SOLUTION INTRAVENOUS at 20:09

## 2020-01-01 RX ADMIN — Medication 50 MILLIEQUIVALENT(S): at 05:16

## 2020-01-01 RX ADMIN — VASOPRESSIN 2.4 UNIT(S)/MIN: 20 INJECTION INTRAVENOUS at 08:00

## 2020-01-01 RX ADMIN — VASOPRESSIN 2.4 UNIT(S)/MIN: 20 INJECTION INTRAVENOUS at 11:49

## 2020-01-01 RX ADMIN — INSULIN HUMAN 1 UNIT(S)/HR: 100 INJECTION, SOLUTION SUBCUTANEOUS at 21:00

## 2020-01-01 RX ADMIN — Medication 3.91 MICROGRAM(S)/KG/MIN: at 00:34

## 2020-01-01 RX ADMIN — Medication 3.35 MICROGRAM(S)/KG/MIN: at 14:22

## 2020-01-01 RX ADMIN — INSULIN HUMAN 7 UNIT(S): 100 INJECTION, SOLUTION SUBCUTANEOUS at 14:50

## 2020-01-01 RX ADMIN — SODIUM CHLORIDE 75 MILLILITER(S): 9 INJECTION, SOLUTION INTRAVENOUS at 14:25

## 2020-01-01 RX ADMIN — SODIUM CHLORIDE 250 MILLILITER(S): 9 INJECTION, SOLUTION INTRAVENOUS at 14:23

## 2020-01-01 RX ADMIN — CHLORHEXIDINE GLUCONATE 1 APPLICATION(S): 213 SOLUTION TOPICAL at 05:23

## 2020-01-01 RX ADMIN — SODIUM CHLORIDE 1000 MILLILITER(S): 9 INJECTION, SOLUTION INTRAVENOUS at 13:00

## 2020-01-01 RX ADMIN — MEROPENEM 100 MILLIGRAM(S): 1 INJECTION INTRAVENOUS at 05:16

## 2020-06-10 NOTE — ADDENDUM
[FreeTextEntry1] : This note was dictated by Denise Owusu, OTR/L, PA\par  \par This note was written by Gurinder Scales on 06/09/2020, acting as a scribe for Pito Puri III, MD

## 2020-06-10 NOTE — PHYSICAL EXAM
[de-identified] : Right Knee: Range of Motion in Degrees	\par 	                  Claimant:	Normal:	\par Flexion Active	  135 	                135-degrees	\par Flexion Passive	  135	                135-degrees	\par Extension Active	  0-5	                0-5-degrees	\par Extension Passive	  0-5	                0-5-degrees	\par \par No weakness to flexion/extension.  No evidence of instability in the AP plane or varus or valgus stress.  Negative  Lachman.  Negative pivot shift.  Negative anterior drawer test.  Negative posterior drawer test.  Negative Arthur.  Negative Apley grind.  No medial or lateral joint line tenderness.  Positive tenderness over the medial and lateral facet of the patella.  Positive patellofemoral crepitations.  No lateral tilting patella.  No patella apprehension.  Positive crepitation in the medial and lateral femoral condyle.  No proximal or distal swelling, edema or tenderness.  No gross motor or sensory deficits.  Mild intra-articular swelling.  2+ DP and PT pulses.  No varus or valgus malalignment.  Skin is intact.  No rashes, scars or lesions.  \par \par Left Knee: Range of Motion in Degrees	\par 	                  Claimant:	Normal:	\par Flexion Active	  135 	                135-degrees	\par Flexion Passive	  135	                135-degrees	\par Extension Active	  0-5	                0-5-degrees	\par Extension Passive	  0-5	                0-5-degrees	\par \par No weakness to flexion/extension.  No evidence of instability in the AP plane or varus or valgus stress.  Negative  Lachman.  Negative pivot shift.  Negative anterior drawer test.  Negative posterior drawer test.  Negative Arthur.  Negative Apley grind.  No medial or lateral joint line tenderness.  Positive tenderness over the medial and lateral facet of the patella.  Positive patellofemoral crepitations.  No lateral tilting patella.  No patella apprehension.  Positive crepitation in the medial and lateral femoral condyle.  No proximal or distal swelling, edema or tenderness.  No gross motor or sensory deficits.  Mild intra-articular swelling.  2+ DP and PT pulses.  No varus or valgus malalignment.  Skin is intact.  No rashes, scars or lesions. \par  [de-identified] : Gait and Station:  Ambulating with a slightly antalgic to antalgic gait.  Normal Station.  [de-identified] : Appearance:  Well developed, well-nourished female in no acute distress.\par   [de-identified] : Radiographs, two views of the right knee, reveal osteoarthritis.\par \par Radiographs, two views of the left knee, reveal osteoarthritis.\par \par Radiograph, one view AP standing of bilateral knees, reveals osteoarthritis.\par

## 2020-06-10 NOTE — PROCEDURE
[de-identified] : Consent: \par At this time, I have recommended an injection to the bilateral knees.  The risks and benefits of the procedure were discussed with the patient in detail.  Upon verbal consent of the patient, we proceeded with the injection as noted below.  \par \par Procedure:  \par After a sterile prep, the patient underwent an injection of 9 cc of 1% Lidocaine without epinephrine and 1 cc of Kenalog into the bilateral knees.  The patient tolerated the procedure well.  There were no complications.  \par \par

## 2020-06-10 NOTE — DISCUSSION/SUMMARY
[de-identified] : At this time, I recommended ice and elevation for the bilateral knee osteoarthritis.  We discussed gel injections, but the patient states her insurance does not cover them\par

## 2020-11-16 PROBLEM — M17.0 PRIMARY OSTEOARTHRITIS OF BOTH KNEES: Status: ACTIVE | Noted: 2019-07-15

## 2020-11-19 NOTE — ADDENDUM
[FreeTextEntry1] : This note was written by Kirsten Amin on 11/19/2020 acting as a scribe for KIMBERLYN YEAGER III, MD

## 2020-11-19 NOTE — DISCUSSION/SUMMARY
[de-identified] : At this time, due to osteoarthritis of bilateral knees, I recommended ice and elevation.  She will be reassessed in three to four weeks.  We will discuss the potential for further intervention, including therapy.

## 2020-11-19 NOTE — PHYSICAL EXAM
[de-identified] : Right Knee: \par Range of Motion in Degrees	\par 	                  Claimant:	Normal:	\par Flexion Active	  135 	                135-degrees	\par Flexion Passive	  135	                135-degrees	\par Extension Active	  0-5	                0-5-degrees	\par Extension Passive	  0-5	                0-5-degrees	\par \par No weakness to flexion/extension.  No evidence of instability in the AP plane or varus or valgus stress.  Negative  Lachman.  Negative pivot shift.  Negative anterior drawer test.  Negative posterior drawer test.  Negative Arthur.  Negative Apley grind.  No medial or lateral joint line tenderness.  Positive tenderness over the medial and lateral facet of the patella.  Positive patellofemoral crepitations.  No lateral tilting patella.  No patella apprehension.  Positive crepitation in the medial and lateral femoral condyle.  No proximal or distal swelling, edema or tenderness.  No gross motor or sensory deficits.  Mild intra-articular swelling.  2+ DP and PT pulses.  No varus or valgus malalignment.  Skin is intact.  No rashes, scars or lesions. \par \par Left Knee: \par Range of Motion in Degrees	\par 	                  Claimant:	Normal:	\par Flexion Active	  135 	                135-degrees	\par Flexion Passive	  135	                135-degrees	\par Extension Active	  0-5	                0-5-degrees	\par Extension Passive	  0-5	                0-5-degrees	\par \par No weakness to flexion/extension.  No evidence of instability in the AP plane or varus or valgus stress.  Negative  Lachman.  Negative pivot shift.  Negative anterior drawer test.  Negative posterior drawer test.  Negative Arthur.  Negative Apley grind.  No medial or lateral joint line tenderness.  Positive tenderness over the medial and lateral facet of the patella.  Positive patellofemoral crepitations.  No lateral tilting patella.  No patella apprehension.  Positive crepitation in the medial and lateral femoral condyle.  No proximal or distal swelling, edema or tenderness.  No gross motor or sensory deficits.  Mild intra-articular swelling.  2+ DP and PT pulses.  No varus or valgus malalignment.  Skin is intact.  No rashes, scars or lesions. \par  [de-identified] : Ambulating with a slightly antalgic to antalgic gait.  Station:  Normal.  [de-identified] : Appearance:  Well-developed, well-nourished female in no acute distress. \par  [de-identified] : Radiographs, one-two views of the right knee, one-two views of the left knee and one view AP Standing, reveal severe osteoarthritis on the left and moderate to severe osteoarthritis on the right.

## 2020-11-19 NOTE — PROCEDURE
[de-identified] : Consent: \par At this time, I have recommended an injection to the right and left knee.  The risks and benefits of the procedure were discussed with the patient in detail.  Upon verbal consent of the patient, we proceeded with the injection as noted below.  \par \par Procedure #1:  \par After a sterile prep, the patient underwent an injection of 9 cc of 1% Lidocaine without epinephrine and 1 cc of Kenalog into the right knee.  The patient tolerated the procedure well.  There were no complications.\par \par Procedure #2:  \par After a sterile prep, the patient underwent an injection of 9 cc of 1% Lidocaine without epinephrine and 1 cc of Kenalog into the left knee.  The patient tolerated the procedure well.  There were no complications.  \par \par

## 2020-11-19 NOTE — HISTORY OF PRESENT ILLNESS
[de-identified] : The patient comes in today for her bilateral knees.  He hasn't been seen in awhile.  He has increasing complaints of pain in his bilateral knees.

## 2020-11-22 PROBLEM — E78.00 PURE HYPERCHOLESTEROLEMIA, UNSPECIFIED: Chronic | Status: ACTIVE | Noted: 2018-05-20

## 2020-11-22 PROBLEM — D86.9 SARCOIDOSIS, UNSPECIFIED: Chronic | Status: ACTIVE | Noted: 2018-05-20

## 2020-11-22 PROBLEM — F41.9 ANXIETY DISORDER, UNSPECIFIED: Chronic | Status: ACTIVE | Noted: 2018-05-20

## 2020-11-22 NOTE — ED ADULT NURSE REASSESSMENT NOTE - NS ED NURSE REASSESS COMMENT FT1
Pt received two units packed red blood cells in ED. NG tube coffee ground emesis 400. Gregory cath placed as per protocol, sterile technique one attempt with good effect. Pt insulin stopped as per MD and prescribed potassium started. As per Admitting doctor Van to go to CCU and not CT scan first.

## 2020-11-22 NOTE — ED PROCEDURE NOTE - CPROC ED GASTRIC INTUB DETAIL1
The orogastric tube (see size above) was inserted via the anatomic location./Gastric tube connected to low continuous suction./Placement was confirmed by aspiration of gastric secretions./Bowel sounds present to 4 quadrants.

## 2020-11-22 NOTE — ED PROVIDER NOTE - PROGRESS NOTE DETAILS
ATTENDING NOTE:   51 y/o F with PMH of GI issue as per EMS, presents unresponsive from home on the bathroom floor PTA. Pt was last spoken to last night and was in nl state of health. This morning after she didn't answer the phone, her friend went over and found her unresponsive on the bathroom floor; unsure how long she was down for. No evidence of drugs or alcohol. EMS was called and gave 2 of NARCAN on route. On arrival pt with oral airway in place with shallow respirations. No BP recorded on route, pupils fixed and dilated, pt george to 50s, FS too high to measure. Pt was intubated for airway protection and anticipated clinical course. Coffee ground emesis noted in OP during intubation; intubated without medication and with direct visualization and color change. At this time pt was started on Levophed and a R femoral line was placed. After tubed, no pulse appreciated and CPR was initiated, initial rhythm PA. Multiple rounds of Eppy given with BiCARB. Pt began pouring coffee ground blood from mouth, given TXA and 2 packs of RBC for possible GI bleed. Pt converted to Vfib, shocked 3x to 200, given Eppy and Mg with ROSC, noted to be george to the 40s, given Atropine and restarted on Levophed and Dopamine to add BP support. OG tube placed. Pt seized, given 2mg Ativan.   On exam: Pt unresponsive, but now responsive to painful stimuli. Pupils fixed and dilated b/l. Ecchymosis noted to b/l UE, b/l medial thigh, and b/l knees. Pt moving extremities spontaneously. JG: Pt had a seizure controlled with Ativan, pressure dropped EPI. Pt started on Eppy drip. JG: pH incalculable, BiCARB not calculate on EBG, will treat presumed DKA. No HX of DM. FS immeasurably high. JG: Started on BiCARB and Insulin drip. JG: ICU aware of pt. JG:   Called by lab, glucose 1974, pt already receiving Insulin bolus and drip, fluids added on. Family notified of poor prognosis. JG: Family is on a flight; friend Sam would like to be updated with pt's status- [phone]. JG: Pt temp noted to be 87.1, placed on a bear hugger. after admission repeat ABG PH now only 6.95, K 2.2. pt getting K to be orderded by ICU ATTENDING NOTE:   53 y/o F with PMH of GI issue as per EMS, presents unresponsive from home on the bathroom floor PTA. Pt was last spoken to last night and was in nl state of health as per ems. This morning after she didn't answer the phone, her friend went over and found her unresponsive on the bathroom floor; unsure how long she was down for. No evidence of drugs or alcohol. EMS was called and gave 2 of NARCAN on route. On arrival pt with oral airway in place with shallow respirations. No BP recorded on route, pupils fixed and dilated, pt george to 50s, FS too high to measure. Pt was intubated for airway protection and anticipated clinical course. Coffee ground emesis noted in OP during intubation; intubated without medication and with direct visualization and color change. At this time pt was started on Levophed and a R femoral line was placed. After tubed, no pulse appreciated and CPR was initiated, initial rhythm PEA. Multiple rounds of Epi given with BiCARB. Pt began pouring coffee ground blood from mouth, given TXA and 2 packs of RBC for possible GI bleed. Pt converted to Vfib, shocked 3x to 200 Joules, given Epi and Mg with ROSC, noted to be george to the 40s, given Atropine and restarted on Levophed and Dopamine to add BP support. OG tube placed. Pt seized, given 2mg Ativan. Code lasting approx 45 minutes  On exam: Pt grimaced with nasal swab/painful stimuli. Pupils fixed and dilated b/l. Ecchymosis noted to b/l UE, b/l medial thigh, and b/l knees. Pt moving occasionally extremities spontaneously. s1s2 george, ctab bl breath sounds, soft nt/nd JG: Pt had a seizure controlled with Ativan, pressure dropped EPI. Pt started on Epi drip. JG: pH incalculable, BiCARB not calculate on VBG, will treat presumed DKA. No HX of DM. FS immeasurably high. after admission repeat ABG PH now only 6.95, K 2.2. pt getting K to be ordered by ICU

## 2020-11-22 NOTE — PATIENT PROFILE ADULT - FLU SEASON?
Spoke to pharmacy and confirmed duplicate request.  Klaudia ZAMORA CMA ([facility])       Yes...

## 2020-11-22 NOTE — ED ADULT NURSE NOTE - PRO INTERPRETER NEED 2
Visit Information Date & Time Provider Department Dept. Phone Encounter #  
 1/9/2017  2:20 PM Chito Shaffer, [address] [facility] [phone] [id number] Follow-up Instructions Return in about 4 months (around 5/9/2017) for BP/chol. Your Appointments 2/21/2017  9:00 AM  
[facility] Appt Note: f/up to 8 month [facility]ok [facility] [address] [place] [address] [phone] [address] [facility] Upcoming Health Maintenance Date Due Hepatitis C Screening 2/9/2017* DTaP/Tdap/Td series (1 - Tdap) 3/22/2017* HEMOGLOBIN A1C Q6M 3/23/2017* LIPID PANEL Q1 4/20/2017* ZOSTER VACCINE AGE 60> 4/21/2017* GLAUCOMA SCREENING Q2Y 5/23/2017* EYE EXAM RETINAL OR DILATED Q1 5/23/2017* MICROALBUMIN Q1 3/21/2017 FOOT EXAM Q1 5/4/2017 MEDICARE YEARLY EXAM 1/10/2018 BREAST CANCER SCRN MAMMOGRAM 2/24/2018 COLONOSCOPY 2/11/2019 *Topic was postponed. The date shown is not the original due date. Allergies as of 1/9/2017  Review Complete On: 1/9/2017 By: Chito Shaffer MD  
  
 Severity Noted Reaction Type Reactions Gloves, Latex With Aloe Vera  08/19/2016    Contact Dermatitis Rash from powder inside latex gloves Current Immunizations  Reviewed on 1/9/2017 Name Date Influenza High Dose Vaccine PF  Incomplete, 11/11/2015 Influenza Vaccine (Quad) PF 10/6/2014 Influenza Vaccine PF 10/31/2013 Pneumococcal Conjugate (PCV-13)  Incomplete Pneumococcal Polysaccharide (PPSV-23) 5/14/2015, 10/31/2013 Td, Adsorbed PF 12/22/2014 Reviewed by Chito Shaffer MD on 1/9/2017 at  2:51 PM  
You Were Diagnosed With   
  
 Codes Comments Routine general medical examination at a health care facility    -  Primary ICD-10-CM: Z00.00 ICD-9-CM: V70.0 Encounter for immunization     ICD-10-CM: D46 ICD-9-CM: V03.89 Screening for cardiovascular condition     ICD-10-CM: Z13.6 ICD-9-CM: V81.2 Hypercholesterolemia     ICD-10-CM: E78.00 ICD-9-CM: 272.0 Vitals BP Pulse Temp Resp Height(growth percentile) Weight(growth percentile) 156/80 83 98.1 °F (36.7 °C) (Oral) 16 5' 3\" (1.6 m) 191 lb (86.6 kg) LMP SpO2 BMI OB Status Smoking Status 11/17/1972 97% 33.83 kg/m2 Hysterectomy Never Smoker Vitals History BMI and BSA Data Body Mass Index Body Surface Area  
 33.83 kg/m 2 1.96 m 2 Preferred Pharmacy Pharmacy Name Phone [facility] [address] [place], [address] Your Updated Medication List  
  
   
This list is accurate as of: 1/9/17  3:13 PM.  Always use your most recent med list.  
  
  
  
  
 cholecalciferol (VITAMIN D3) 5,000 unit Tab tablet Commonly known as:  VITAMIN D3 Take  by mouth. FIBER PO Take  by mouth. fluconazole 150 mg tablet Commonly known as:  DIFLUCAN Take 1 Tab by mouth daily for 1 day. FDA advises cautious prescribing of oral fluconazole in pregnancy. glucose blood VI test strips strip Commonly known as:  FREESTYLE LITE STRIPS Blood sugar check 3-4 times a day due to fluctuating blood sugar  
  
 insulin glargine 100 unit/mL (3 mL) pen Commonly known as:  LANTUS SOLOSTAR INJECT 30 UNITS BENEATH THE SKIN AT BEDTIME  
  
 insulin lispro 100 unit/mL kwikpen Commonly known as:  HUMALOG  
by SubCUTAneous route Before breakfast, lunch, dinner and at bedtime. levothyroxine 175 mcg tablet Commonly known as:  SYNTHROID  
TAKE ONE TABLET BY MOUTH DAILY BEFORE BREAKFAST  
  
 meclizine 25 mg tablet Commonly known as:  ANTIVERT Take  by mouth three (3) times daily as needed. meloxicam 15 mg tablet Commonly known as:  MOBIC Take 1 Tab by mouth daily (with breakfast). NIFEdipine ER 30 mg ER tablet Commonly known as:  ADALAT CC Take 1 Tab by mouth daily. olmesartan-hydroCHLOROthiazide 40-25 mg per tablet Commonly known as:  BENICAR HCT  
TAKE ONE TABLET BY MOUTH EVERY DAY  
  
 omeprazole 10 mg capsule Commonly known as:  PRILOSEC Take 10 mg by mouth daily. simvastatin 40 mg tablet Commonly known as:  ZOCOR  
TAKE ONE TABLET BY MOUTH AT NIGHTLY Prescriptions Sent to Pharmacy Refills NIFEdipine ER (ADALAT CC) 30 mg ER tablet 6 Sig: Take 1 Tab by mouth daily. Class: Normal  
 Pharmacy: [facility] [place] [address] Ph #: [phone] Route: Oral  
 fluconazole (DIFLUCAN) 150 mg tablet 1 Sig: Take 1 Tab by mouth daily for 1 day. FDA advises cautious prescribing of oral fluconazole in pregnancy. Class: Normal  
 Pharmacy: [facility] [address] [place] [address] Ph #: [phone] Route: Oral  
  
We Performed the Following ADMIN INFLUENZA VIRUS VAC [ [facility]] INFLUENZA VIRUS VACCINE, HIGH DOSE SEASONAL, PRESERVATIVE FREE [92321 CPT(R)] PNEUMOCOCCAL CONJ VACCINE 13 VALENT IM [id number] CPT(R)] Follow-up Instructions Return in about 4 months (around 5/9/2017) for BP/chol. To-Do List   
 01/23/2017 Lab:  ALT   
  
 01/23/2017 Lab:  AST   
  
 01/23/2017 Lab:  LIPID PANEL   
  
 01/23/2017 Lab:  METABOLIC PANEL, BASIC   
  
 02/07/2017 1:00 PM  
  Appointment with MATTHEW MILLARD at [address] [place] [address] ([phone]) PAYMENT  For Non-Medicare patients - $15.00 will be collected from you at the time of your exam.  You will be billed $35.00 from the reading [facility]. OUTSIDE FILMS  - Any outside films related to the study being scheduled should be brought with you on the day of the exam.  If this cannot be done there may be a delay in the reading of the study. MEDICATIONS  - Patient must bring a complete list of all medications currently taking to include prescriptions, over-the-counter meds, herbals, vitamins & any dietary supplements GENERAL INSTRUCTIONS  - On the day of your exam do not use any bath powder, deodorant or lotions on the armpit area. Patient Instructions Well Visit, Over 72: Care Instructions Your Care Instructions Physical exams can help you stay healthy. Your doctor has checked your overall health and may have suggested ways to take good care of yourself. He or she also may have recommended tests. At home, you can help prevent illness with healthy eating, regular exercise, and other steps. Follow-up care is a key part of your treatment and safety. Be sure to make and go to all appointments, and call your doctor if you are having problems. It's also a good idea to know your test results and keep a list of the medicines you take. How can you care for yourself at home? · Reach and stay at a healthy weight. This will lower your risk for many problems, such as obesity, diabetes, heart disease, and high blood pressure. · Get at least 30 minutes of exercise on most days of the week. Walking is a good choice. You also may want to do other activities, such as running, swimming, cycling, or playing tennis or team sports. · Do not smoke. Smoking can make health problems worse. If you need help quitting, talk to your doctor about stop-smoking programs and medicines. These can increase your chances of quitting for good. · Protect your skin from too much sun. When you're outdoors from 10 a.m. to 4 p.m., stay in the shade or cover up with clothing and a hat with a wide brim. Wear sunglasses that block UV rays. Even when it's cloudy, put broad-spectrum sunscreen (SPF 30 or higher) on any exposed skin. · See a dentist one or two times a year for checkups and to have your teeth cleaned. · Wear a seat belt in the car. · Limit alcohol to 2 drinks a day for men and 1 drink a day for women. Too much alcohol can cause health problems. Follow your doctor's advice about when to have certain tests. These tests can spot problems early. For men and women · Cholesterol. Your doctor will tell you how often to have this done based on your overall health and other things that can increase your risk for heart attack and stroke. · Blood pressure. Have your blood pressure checked during a routine doctor visit. Your doctor will tell you how often to check your blood pressure based on your age, your blood pressure results, and other factors. · Diabetes. Ask your doctor whether you should have tests for diabetes. · Vision. Experts recommend that you have yearly exams for glaucoma and other age-related eye problems. · Hearing. Tell your doctor if you notice any change in your hearing. You can have tests to find out how well you hear. · Colon cancer tests. Keep having colon cancer tests as your doctor recommends. You can have one of several types of tests. · Heart attack and stroke risk. At least every 4 to 6 years, you should have your risk for heart attack and stroke assessed. Your doctor uses factors such as your age, blood pressure, cholesterol, and whether you smoke or have diabetes to show what your risk for a heart attack or stroke is over the next 10 years. · Osteoporosis. Talk to your doctor about whether you should have a bone density test to find out whether you have thinning bones. Also ask your doctor about whether you should take calcium and vitamin D supplements. For women · Pap test and pelvic exam. You may no longer need a Pap test. Talk with your doctor about whether to stop or continue to have Pap tests. · Breast exam and mammogram. Ask how often you should have a mammogram, which is an X-ray of your breasts. A mammogram can spot breast cancer before it can be felt and when it is easiest to treat. English · Thyroid disease. Talk to your doctor about whether to have your thyroid checked as part of a regular physical exam. Women have an increased chance of a thyroid problem. For men · Prostate exam. Talk to your doctor about whether you should have a blood test (called a PSA test) for prostate cancer. Experts disagree on whether men should have this test. Some experts recommend that you discuss the benefits and risks of the test with your doctor. · Abdominal aortic aneurysm. Ask your doctor whether you should have a test to check for an aneurysm. You may need a test if you ever smoked or if your parent, brother, sister, or child has had an aneurysm. When should you call for help? Watch closely for changes in your health, and be sure to contact your doctor if you have any problems or symptoms that concern you. Where can you learn more? Go to http://chasidy-ena.info/. Enter J128 in the search box to learn more about \"Well Visit, Over 65: Care Instructions. \" Current as of: July 19, 2016 Content Version: 11.1 © 0829-5473 [employer]. Care instructions adapted under license by [employer] (which disclaims liability or warranty for this information). If you have questions about a medical condition or this instruction, always ask your healthcare professional. [address] any warranty or liability for your use of this information. Introducing [facility]! Dear Khoi Subramanian: Thank you for requesting a wizboo account. Our records indicate that you already have an active wizboo account. You can access your account anytime at [email or web address] Did you know that you can access your hospital and ER discharge instructions at any time in wizboo? You can also review all of your test results from your hospital stay or ER visit. Additional Information If you have questions, please visit the Frequently Asked Questions section of the [employer] website at [email or web address] Remember, Swapdom is NOT to be used for urgent needs. For medical emergencies, dial 911. Now available from your iPhone and Android! Please provide this summary of care documentation to your next provider. Your primary care clinician is listed as [address] [facility]. If you have any questions after today's visit, please call [phone].

## 2020-11-22 NOTE — ED PROCEDURE NOTE - CPROC ED INFUS LINE DETAIL1
The location was identified, and the area was draped and prepped./The catheter was placed using sterile technique./All lumen(s) aspirated and flushed without difficulty./The guidewire was recovered./Ultrasound guidance was used during placement.

## 2020-11-22 NOTE — ED PROVIDER NOTE - PHYSICAL EXAMINATION
CONST: Active cardiac arrest, unresponsive  EYES: pupils fixed and dilated  ENT: oral airway in place, visible coffee ground emesis  CARD: no heart sounds auscultated, no palpable pulses  RESP: minimal air movement B/L, CTAB  GI: Soft, non-tender, non-distended.  MS: spontaneous movement of extremities  SKIN: cold, mottled, ecchymosis to B/L UE and B/L groin region and knees  NEURO: responsive only to painful stimuli CONST: Active cardiac arrest, unresponsive  EYES: pupils fixed and dilated  ENT: oral airway in place, visible coffee ground emesis  CARD: no heart sounds auscultated, no palpable pulses  RESP: shallow respirations, CTAB  GI: Soft, non-tender, non-distended.  MS: spontaneous movement of extremities  SKIN: cold, mottled, ecchymosis to B/L UE and B/L groin region and knees  NEURO: responsive only to painful stimuli

## 2020-11-22 NOTE — ED PROVIDER NOTE - OBJECTIVE STATEMENT
51 yo F BIBEMS unresponsive from home, pt was found on the bathroom floor by her friend. Pt was last spoken to last night and was in normal state of health as per ems. This morning after she didn't answer the phone, her friend went over and found her unresponsive on the bathroom floor, unsure how long pt was unresponsive for. EMS gave 2 of NARCAN on route, placed oral airway. On arrival pt with oral airway in place with shallow respirations. No evidence of alcohol or drug use.

## 2020-11-22 NOTE — H&P ADULT - NSHPPHYSICALEXAM_GEN_ALL_CORE
GENERAL: intubated sedated   HEAD:  Atraumatic, Normocephalic  EYES: EOMI, PERRLA, conjunctiva and sclera clear  ENT: Moist mucous membranes  NECK: Supple, No JVD  CHEST/LUNG: Clear to auscultation bilaterally  HEART: Regular rate and rhythm; No murmurs, rubs, or gallops  ABDOMEN: soft, Nontender, Nondistended. No hepatomegally  EXTREMITIES:  2+ Peripheral Pulses, brisk capillary refill. No clubbing, cyanosis, or edema  NERVOUS SYSTEM:  sedated GENERAL: intubated sedated   HEAD:  Atraumatic, Normocephalic  EYES: EOMI, PERRLA, conjunctiva and sclera clear  ENT: Moist mucous membranes  CHEST/LUNG: Clear to auscultation bilaterally  HEART: Regular rate and rhythm; No murmurs, rubs, or gallops  ABDOMEN: soft, Nontender, Nondistended. No hepatomegally  EXTREMITIES:  2+ Peripheral Pulses, cold extremities   NERVOUS SYSTEM: Pt unresponsive, but now responsive to painful stimuli. Pupils fixed and dilated b/l. Ecchymosis noted to b/l UE, b/l medial thigh, and b/l knees. Pt moving extremities spontaneously.

## 2020-11-22 NOTE — H&P ADULT - ASSESSMENT
Cardiac arrest Likely secondary to Severe HAGMA due to HHS vs DKA (no hx of DM), possible new onset diabetes given hyperglycemia. Vifb, 3 shocks   Severe Hypovolemia s/p 3 Ls   Shock (hypovolemic/cardiac)  seizure like activity following cardiac arrest s/p ativan   NAVIN and transamenemia 2/2 hypoperfusion   Coffee ground in OG tube s/p2 PRBCs in ED for suspected GIB (not uncommon with severe DKA)     PLAN:    CNS: EEG, Head CT    HEENT: oral care     PULMONARY: HOB 45     CARDIOVASCULAR: strict intake utput, 2decho, wean off pressors,     GI: GI prophylaxis.  Feeding NPO for now, protonix 40 QD     RENAL: BMP q6, monitor K, phosphorus and other electrolytes, monitor UO    INFECTIOUS DISEASE: leukocytosis likely reactive, no other evidence of infectious process, will treat empirically with Yordan and vancomycin, D/c Abx if cultures are negative     HEMATOLOGICAL:  DVT prophylaxis. Mechanical for now, active type and screen, monitor CBC    ENDOCRINE: FS Q1 hour, Endocrinology consult, Insulin per DKA protocol, AntiGAD, anti iset Abs,    MUSCULOSKELETAL: bedrest     femoral TLC  Foleys catheter          Cardiac arrest Likely secondary to Severe HAGMA due to HHS vs DKA (no hx of DM), possible new onset diabetes given hyperglycemia. Vifb, 3 shocks   Severe Hypovolemia s/p 3 Ls   Shock (hypovolemic/cardiac)  seizure like activity following cardiac arrest s/p ativan   NAVIN and transamenemia 2/2 hypoperfusion   Coffee ground in OG tube s/p2 PRBCs in ED for suspected GIB (not uncommon with severe DKA)     PLAN:    CNS: EEG, Head CT    HEENT: oral care     PULMONARY: HOB 45     CARDIOVASCULAR: strict intake output, 2decho, wean off pressors, troponin, c/w fluid resuscitation s/p 3 Ls      GI: GI prophylaxis.  Feeding NPO for now, protonix 40 QD, lipase     RENAL: BMP q6, monitor K, phosphorus and other electrolytes, monitor UO    INFECTIOUS DISEASE: leukocytosis likely reactive, no other evidence of infectious process, will treat empirically with Yordan and vancomycin, D/c Abx if cultures are negative     HEMATOLOGICAL:  DVT prophylaxis. Mechanical for now, active type and screen, monitor CBC    ENDOCRINE: FS Q1 hour, Endocrinology consult, Insulin per DKA protocol, AntiGAD, anti iset Abs,    MUSCULOSKELETAL: bedrest     femoral TLC  Foleys catheter          Cardiac arrest Likely secondary to Severe HAGMA due to HHS vs DKA (no hx of DM), possible new onset diabetes given hyperglycemia. Vifb, 3 shocks   Severe Hypovolemia s/p 3 Ls   Shock (hypovolemic/cardiac)  Hypothermia   seizure like activity following cardiac arrest s/p ativan   NAVIN and transamenemia 2/2 hypoperfusion   Coffee ground in OG tube s/p2 PRBCs in ED for suspected GIB (not uncommon with severe DKA)     PLAN:    CNS: EEG, Head CT,     HEENT: oral care     PULMONARY: HOB 45, Fio2 60%, PEEP 5, repeat ABG      CARDIOVASCULAR: strict intake output, 2decho, wean off pressors, troponin, c/w fluid resuscitation s/p 3 Ls      GI: GI prophylaxis.  Feeding NPO for now, Protonix 40 BID, lipase     RENAL: BMP q6, monitor K, phosphorus and other electrolytes, monitor UO    INFECTIOUS DISEASE: leukocytosis likely reactive, no other evidence of infectious process, will treat empirically with Yordan and vancomycin, D/c Abx if cultures are negative     HEMATOLOGICAL:  DVT prophylaxis. Mechanical for now, active type and screen, monitor CBC    ENDOCRINE: FS Q1 hour, Endocrinology consult, Insulin per DKA protocol, AntiGAD, anti iset Abs,    MUSCULOSKELETAL: bedrest     femoral TLC  Foleys catheter

## 2020-11-22 NOTE — ED PROVIDER NOTE - CLINICAL SUMMARY MEDICAL DECISION MAKING FREE TEXT BOX
see progress note, pt s/p cardiac arrest, with severe acidosis, hyperglycemia, ivf, insulin bolus and drip given, admitted to ICU. hypothermia corrected with bear hugger, abx for sirs. poor prognosis discussed with patient family

## 2020-11-22 NOTE — ED PROVIDER NOTE - CARE PLAN
Principal Discharge DX:	Cardiac arrest  Secondary Diagnosis:	Diabetic ketoacidosis  Secondary Diagnosis:	Hypothermia

## 2020-11-22 NOTE — ED PROVIDER NOTE - CRITICAL CARE PROVIDED
interpretation of diagnostic studies/consultation with other physicians/telephone consultation with the patient's family/documentation/additional history taking/direct patient care (not related to procedure)/consult w/ pt's family directly relating to pts condition

## 2020-11-22 NOTE — CHART NOTE - NSCHARTNOTEFT_GEN_A_CORE
pt received from ER @ 6:30pm . pt's post cardiac arrest w/ ROSC.   pt's severely acidotic (pH-6.9) w/ bld gluc >1400 serum HCO3-9, pt still hypotensive on high dose levophed., high dose dopamine, will start IV insulin, Iv bicarb drip, IV hydrocortisone , IV vasopressin ,  Discussed w/ ICU Nicanor AUGUST.

## 2020-11-22 NOTE — H&P ADULT - NSHPLABSRESULTS_GEN_ALL_CORE
13.7   17.89 )-----------( 137      ( 22 Nov 2020 13:57 )             51.2       11-22    136  |  94<L>  |  73<HH>  ----------------------------<  >1400<HH>  4.8   |  6<LL>  |  3.7<H>    Ca    8.6      22 Nov 2020 13:57  Mg     7.5     11-22    TPro  4.7<L>  /  Alb  2.6<L>  /  TBili  0.4  /  DBili  x   /  AST  365<H>  /  ALT  239<H>  /  AlkPhos  79  11-22          ABG - ( 22 Nov 2020 13:45 )  pH, Arterial: x     pH, Blood: x     /  pCO2: 30    /  pO2: 333   / HCO3: x     / Base Excess: x     /  SaO2: 99                      PT/INR - ( 22 Nov 2020 13:57 )   PT: 13.20 sec;   INR: 1.15 ratio         PTT - ( 22 Nov 2020 13:57 )  PTT:33.5 sec    Lactate Trend      CARDIAC MARKERS ( 22 Nov 2020 13:57 )  x     / <0.01 ng/mL / x     / x     / x      CARDIAC MARKERS ( 22 Nov 2020 13:50 )  x     / x     / 335 U/L / x     / x            CAPILLARY BLOOD GLUCOSE      POCT Blood Glucose.: >600 mg/dL (22 Nov 2020 15:07)

## 2020-11-22 NOTE — ED PROCEDURE NOTE - CPROC ED POST RADIOGRAPHY1
gastric tube in stomach/duodenum/post-procedure radiography performed
post procedure radiography not performed

## 2020-11-22 NOTE — H&P ADULT - HISTORY OF PRESENT ILLNESS
52 year old female with PMH of GI issue as per EMS, presents unresponsive from home on the bathroom floor PTA. Pt was last spoken to last night and was in normal state of health. This morning after she didn't answer the phone, her friend went over and found her unresponsive on the bathroom floor; unsure how long she was down for. No evidence of drugs or alcohol. EMS was called and gave 2 of NARCAN on route. On arrival pt with oral airway in place with shallow respirations. No BP recorded on route, pupils fixed and dilated, pt george to 50s, FS too high to measure. Pt was intubated for airway protection and anticipated clinical course. Coffee ground emesis noted in OP during intubation; intubated without medication and with direct visualization and color change. At this time pt was started on Levophed and a R femoral line was placed. After tubed, no pulse appreciated and CPR was initiated, initial rhythm PA. Multiple rounds of Eppy given with BiCARB. Pt began pouring coffee ground blood from mouth, given TXA and 2 packs of RBC for possible GI bleed. Pt converted to Vfib, shocked 3x to 200, given Eppy and Mg with ROSC, noted to be george to the 40s, given Atropine and restarted on Levophed and Dopamine to add BP support. OG tube placed. Pt seized, given 2mg Ativan.   On exam: Pt unresponsive, but now responsive to painful stimuli. Pupils fixed and dilated b/l. Ecchymosis noted to b/l UE, b/l medial thigh, and b/l knees. Pt moving extremities spontaneously.    52 year old female with PMH of hyperlipidemia, Sarcoidosis?, and anxiety BIBEMS after she was found unresponsive in the floor for unknown duration.   per EMS, presents unresponsive from home on the bathroom floor PTA. Pt was last spoken to last night and was in normal state of health. This morning after she didn't answer the phone, her friend went over and found her unresponsive on the bathroom floor; unsure how long she was down for. No evidence of drugs or alcohol. EMS was called and gave 2 of NARCAN on route. On arrival pt with oral airway in place with shallow respirations. No BP recorded on route, pupils fixed and dilated, pt george to 50s, FS too high to measure. Pt was intubated for airway protection and anticipated clinical course. Coffee ground emesis noted in OP during intubation; intubated without medication and with direct visualization and color change. At this time pt was started on Levophed and a R femoral line was placed. After tubed, no pulse appreciated and CPR was initiated, initial rhythm PA. Multiple rounds of Eppy given with BiCARB. Pt began pouring coffee ground blood from mouth, given TXA and 2 packs of RBC for possible GI bleed. Pt converted to Vfib, shocked 3x to 200, given Eppy and Mg with ROSC, noted to be george to the 40s, given Atropine and restarted on Levophed and Dopamine to add BP support. OG tube placed. Pt seized, given 2mg Ativan.   In ED patient was found to be in DKA/HHS picture with    52 year old female with PMH of hyperlipidemia, Sarcoidosis?, and anxiety BIBEMS after she was found unresponsive in the floor for unknown duration.   per EMS, presents unresponsive from home on the bathroom floor PTA. Pt was last spoken to last night and was in normal state of health. This morning after she didn't answer the phone, her friend went over and found her unresponsive on the bathroom floor; unsure how long she was down for. No evidence of drugs or alcohol. EMS was called and gave 2 of NARCAN on route. On arrival pt with oral airway in place with shallow respirations. No BP recorded on route, pupils fixed and dilated, pt george to 50s, FS too high to measure. Pt was intubated for airway protection and anticipated clinical course. Coffee ground emesis noted in OP during intubation; intubated without medication and with direct visualization and color change. At this time pt was started on Levophed and a R femoral line was placed. After tubed, no pulse appreciated and CPR was initiated, initial rhythm PA. Multiple rounds of Eppy given with BiCARB. Pt began pouring coffee ground blood from mouth, given TXA and 2 packs of RBC for possible GI bleed. Pt converted to Vfib, shocked 3x to 200, given Eppy and Mg with ROSC, noted to be george to the 40s, given Atropine and restarted on Levophed and Dopamine to add BP support. OG tube placed. Pt seized, given 2mg Ativan.   In ED patient was found to be in DKA/HHS picture with NAVIN, she moves her extremities and responds to painful stimuli after cardiac arrest.

## 2020-11-22 NOTE — ED ADULT TRIAGE NOTE - CHIEF COMPLAINT QUOTE
last known well at 7pm last night, friend found at 11 am today unresponsive, had nausea/vomit and diarrhea last few days, found on bathroom floor, narcan 2 mg x2 given IN, b/p 80/50/ heartrate 40 glucose was high on monitor , RR 6 , ambu bag by EMS,

## 2020-11-23 NOTE — PROGRESS NOTE ADULT - ASSESSMENT
IMPRESSION:  Septic shock vs cardiogenic shock  Acute respiratory failure  Cardiac arrest  Severe metabolic acidosis  Rhabdomyolysis  Hypernatremia  DKA  NAVIN  HO Sarcoidosis    PLAN:    CNS: Avoid sedation    HEENT: Oral care    PULMONARY:  HOB @ 45 degrees.  Vent changes as follows: PEEP 10, RR 24 FiO2 100    CARDIOVASCULAR: Wean dopamine, Continue levophed and vaso Target MAP 65, Check 2 echo, Cheetah HD, Start bicarb at 75cc/hr, D5W At 100/hr    GI: GI prophylaxis.  OGT to suction    RENAL:  Follow up lytes.  Correct as needed. Nephrology consult    INFECTIOUS DISEASE: Follow up cultures. Continue meropenem, check vancomycin level    HEMATOLOGICAL:  DVT prophylaxis. Check LE duplex    ENDOCRINE:  Follow up FS.  Insulin drip    MUSCULOSKELETAL: Bedrest    Overall prognosis very poor    Family at bedside         IMPRESSION:  Septic shock vs cardiogenic shock  Acute respiratory failure  Cardiac arrest  Severe metabolic acidosis  Rhabdomyolysis  Hypernatremia  DKA  NAVIN  HO Sarcoidosis    SUGGEST:      CNS: Avoid sedation    HEENT: Oral care    PULMONARY:  HOB @ 45 degrees.  Vent changes as follows: PEEP 10, RR 24 FiO2 100    CARDIOVASCULAR: Wean dopamine, Continue levophed and vaso Target MAP 65,   Check 2 echo,   Cheetah HD,   Start bicarb at 75cc/hr,   D5W At 100/hr    GI: GI prophylaxis.  OGT to suction    RENAL:  Follow up lytes.  Correct as needed. Nephrology consult    INFECTIOUS DISEASE: Follow up cultures. Continue meropenem, check vancomycin level    HEMATOLOGICAL:  DVT prophylaxis. Check LE duplex    ENDOCRINE:  Follow up FS.  Insulin drip    MUSCULOSKELETAL: Bedrest    Overall prognosis grave for neurologic recovery.    Family at bedside

## 2020-11-23 NOTE — PROGRESS NOTE ADULT - ASSESSMENT
51 y/o F with PMH of GI issue as per EMS, presents unresponsive from home on the bathroom floor PTA. Pt was last spoken to last night and was in nl state of health as per ems. This morning after she didn't answer the phone, her friend went over and found her unresponsive on the bathroom floor; unsure how long she was down for. No evidence of drugs or alcohol. EMS was called and gave 2 of NARCAN on route. On arrival pt with oral airway in place with shallow respirations. No BP recorded on route, pupils fixed and dilated, pt george to 50s, FS too high to measure. Pt was intubated for airway protection and anticipated clinical course. Coffee ground emesis noted in OP during intubation; intubated without medication and with direct visualization and color change. At this time pt was started on Levophed and a R femoral line was placed. After tubed, no pulse appreciated and CPR was initiated, initial rhythm PEA. Multiple rounds of Epi given with BiCARB. Pt began pouring coffee ground blood from mouth, given TXA and 2 packs of RBC for possible GI bleed. Pt converted to Vfib, shocked 3x to 200 Joules, given Epi and Mg with ROSC, noted to be george to the 40s, given Atropine and restarted on Levophed and Dopamine to add BP support. OG tube placed. Pt seized, given 2mg Ativan. Code lasting approx 45 minutesProgress:     In ED Pt had a seizure and was given Ativan, pressure dropped and Pt started on Epi drip. Insulin drip started for treatment of DKA 53 y/o F with PMH of GI issue as per EMS, presents unresponsive from home on the bathroom floor PTA. Pt was last spoken to last night and was in nl state of health as per ems. Yesterday  after she didn't answer the phone, her friend went over and found her unresponsive on the bathroom floor; unsure how long she was down for. No evidence of drugs or alcohol. EMS was called and gave 2 of NARCAN on route. On arrival pt with oral airway in place with shallow respirations. No BP recorded on route, pupils fixed and dilated, pt george to 50s, FS too high to measure. Pt was intubated for airway protection and anticipated clinical course. Coffee ground emesis noted in OP during intubation; intubated without medication and with direct visualization and color change. At this time pt was started on Levophed and a R femoral line was placed. After tubed, no pulse appreciated and CPR was initiated, initial rhythm PEA. Multiple rounds of Epi given with BiCARB. Pt began pouring coffee ground blood from mouth, given TXA and 2 packs of RBC for possible GI bleed. Pt converted to Vfib, shocked 3x to 200 Joules, given Epi and Mg with ROSC, noted to be george to the 40s, given Atropine and restarted on Levophed and Dopamine to add BP support. OG tube placed. Pt seized, given 2mg Ativan. Code lasting approx 45 minutes, Insulin drip started for treatment of DKA    acute respiratory failure / cardiac arrest / DKA - severe metabolic acidosis / acute kidney injury / shock liver / 53 y/o F with PMH of GI issue as per EMS, presents unresponsive from home on the bathroom floor PTA. Pt was last spoken to last night and was in nl state of health as per ems. Yesterday  after she didn't answer the phone, her friend went over and found her unresponsive on the bathroom floor; unsure how long she was down for. No evidence of drugs or alcohol. EMS was called and gave 2 of NARCAN on route. On arrival pt with oral airway in place with shallow respirations. No BP recorded on route, pupils fixed and dilated, pt george to 50s, FS too high to measure. Pt was intubated for airway protection and anticipated clinical course. Coffee ground emesis noted in OP during intubation; intubated without medication and with direct visualization and color change. At this time pt was started on Levophed and a R femoral line was placed. After tubed, no pulse appreciated and CPR was initiated, initial rhythm PEA. Multiple rounds of Epi given with BiCARB. Pt began pouring coffee ground blood from mouth, given TXA and 2 packs of RBC for possible GI bleed. Pt converted to Vfib, shocked 3x to 200 Joules, given Epi and Mg with ROSC, noted to be george to the 40s, given Atropine and restarted on Levophed and Dopamine to add BP support. OG tube placed. Pt seized, given 2mg Ativan. Code lasting approx 45 minutes, Insulin drip started for treatment of DKA    acute respiratory failure / cardiac arrest - possible type 2 NSTEMI - ventricular fibrillation /  / DKA - severe metabolic acidosis / acute kidney injury / shock liver / septic vs cardiogenic shock (POA) / 53 y/o F with PMH of GI issue as per EMS, presents unresponsive from home on the bathroom floor PTA. Pt was last spoken to last night and was in nl state of health as per ems. Yesterday  after she didn't answer the phone, her friend went over and found her unresponsive on the bathroom floor; unsure how long she was down for. No evidence of drugs or alcohol. EMS was called and gave 2 of NARCAN on route. On arrival pt with oral airway in place with shallow respirations. No BP recorded on route, pupils fixed and dilated, pt george to 50s, FS too high to measure. Pt was intubated for airway protection and anticipated clinical course. Coffee ground emesis noted in OP during intubation; intubated without medication and with direct visualization and color change. At this time pt was started on Levophed and a R femoral line was placed. After tubed, no pulse appreciated and CPR was initiated, initial rhythm PEA. Multiple rounds of Epi given with BiCARB. Pt began pouring coffee ground blood from mouth, given TXA and 2 packs of RBC for possible GI bleed. Pt converted to Vfib, shocked 3x to 200 Joules, given Epi and Mg with ROSC, noted to be george to the 40s, given Atropine and restarted on Levophed and Dopamine to add BP support. OG tube placed. Pt seized, given 2mg Ativan. Code lasting approx 45 minutes, Insulin drip started for treatment of DKA    acute respiratory failure / cardiac arrest - possible type 2 NSTEMI - ventricular fibrillation  /  / DKA - severe metabolic acidosis / acute kidney injury / shock liver / septic vs cardiogenic shock (POA) / hypernatremia       - continue IV fluids and bicarb    -  IV Insulin drip - check hourly fs   - Initiate CVVH as per nephrology   - check eeg, maintain magnesium above 2, neurology consult   - hold sedation and follow mental status   - continue pressors and maintain MAP >60    - broad spectrum antibiotics, check cultures   - Solu-cortef   - DVT and GI prophylaxis    - grave prognosis, family aware   - I spoke with son at bedside all questions answered. 51 y/o F with PMH of GI issue as per EMS, presents unresponsive from home on the bathroom floor PTA. Pt was last spoken to last night and was in nl state of health as per ems. Yesterday  after she didn't answer the phone, her friend went over and found her unresponsive on the bathroom floor; unsure how long she was down for. No evidence of drugs or alcohol. EMS was called and gave 2 of NARCAN on route. On arrival pt with oral airway in place with shallow respirations. No BP recorded on route, pupils fixed and dilated, pt george to 50s, FS too high to measure. Pt was intubated for airway protection and anticipated clinical course. Coffee ground emesis noted in OP during intubation; intubated without medication and with direct visualization and color change. At this time pt was started on Levophed and a R femoral line was placed. After tubed, no pulse appreciated and CPR was initiated, initial rhythm PEA. Multiple rounds of Epi given with BiCARB. Pt began pouring coffee ground blood from mouth, given TXA and 2 packs of RBC for possible GI bleed. Pt converted to Vfib, shocked 3x to 200 Joules, given Epi and Mg with ROSC, noted to be george to the 40s, given Atropine and restarted on Levophed and Dopamine to add BP support. OG tube placed. Pt seized, given 2mg Ativan. Code lasting approx 45 minutes, Insulin drip started for treatment of DKA    acute respiratory failure / cardiac arrest - possible type 2 NSTEMI - ventricular fibrillation  /  / DKA - severe metabolic acidosis / acute kidney injury / shock liver / septic vs cardiogenic shock (POA) / hypernatremia       - continue IV fluids and bicarb    -  IV Insulin drip - check hourly fs   - Initiate CVVH as per nephrology   - check eeg, maintain magnesium above 2, neurology consult   - hold sedation and follow mental status   - continue pressors and maintain MAP >60    - broad spectrum antibiotics, check cultures   - continue Solu-cortef for now   - DVT and GI prophylaxis    - grave prognosis, family aware   - I spoke with son at bedside all questions answered.

## 2020-11-23 NOTE — PROGRESS NOTE ADULT - ATTENDING COMMENTS
Attending Statement: I have personally performed a face to face diagnostic evaluation on this patient. The patient is suffering from Septic shock vs cardiogenic shock  Acute respiratory failure  Cardiac arrest  Severe metabolic acidosis.   I have reviewed the above note and agree with the history, exam and suggestions for care, except as I have noted in the text.

## 2020-11-23 NOTE — PROGRESS NOTE ADULT - SUBJECTIVE AND OBJECTIVE BOX
Patient is a 52y old  Female who presents with a chief complaint of cardiac arrest (23 Nov 2020 11:49)      T(F): 101.8 (11-23-20 @ 11:00), Max: 102 (11-23-20 @ 08:00)  HR: 123 (11-23-20 @ 12:05)  BP: 124/87 (11-22-20 @ 23:17)  RR: 30 (11-23-20 @ 11:00)  SpO2: 88% (11-23-20 @ 12:05) (62% - 100%)    PHYSICAL EXAM:  GENERAL: NAD, well-groomed, well-developed  HEAD:  Atraumatic, Normocephalic  EYES: EOMI, PERRLA, conjunctiva and sclera clear  ENMT: No tonsillar erythema, exudates, or enlargement; Moist mucous membranes, Good dentition, No lesions  NECK: Supple, No JVD, Normal thyroid  NERVOUS SYSTEM:  Alert & Oriented X3, Good concentration; Motor Strength 5/5 B/L upper and lower extremities; DTRs 2+ intact and symmetric  CHEST/LUNG: Clear to percussion bilaterally; No rales, rhonchi, wheezing, or rubs  HEART: Regular rate and rhythm; No murmurs, rubs, or gallops  ABDOMEN: Soft, Nontender, Nondistended; Bowel sounds present  EXTREMITIES:  2+ Peripheral Pulses, No clubbing, cyanosis, or edema  LYMPH: No lymphadenopathy noted  SKIN: No rashes or lesions    LABS  11-23    160<H>  |  125<H>  |  61<HH>  ----------------------------<  174<H>  5.6<H>   |  8<LL>  |  4.0<H>    Ca    7.2<L>      23 Nov 2020 11:30  Phos  9.8     11-23  Mg     3.1     11-23    TPro  4.3<L>  /  Alb  2.2<L>  /  TBili  1.1  /  DBili  x   /  AST  >700<H>  /  ALT  314<H>  /  AlkPhos  108  11-23                          16.4   22.75 )-----------( 69       ( 23 Nov 2020 05:46 )             47.1     PT/INR - ( 22 Nov 2020 13:57 )   PT: 13.20 sec;   INR: 1.15 ratio         PTT - ( 22 Nov 2020 13:57 )  PTT:33.5 sec  Mode: Auto Mode: PRVC/ Volume Support  RR (machine): 30  TV (machine): 400  FiO2: 100  PEEP: 10  ITime: 1  MAP: 14  PIP: 19    CARDIAC ENZYMES  Creatine Kinase, Serum: 1760 (11-23 @ 11:30)  Creatine Kinase, Serum: 14738 (11-22 @ 19:54)  Creatine Kinase, Serum: 335 (11-22 @ 13:50)      Troponin T, Serum: 0.67 ng/mL (11-23-20 @ 11:30)  Troponin T, Serum: <0.01 ng/mL (11-22-20 @ 13:57)    < from: 12 Lead ECG (11.22.20 @ 16:23) >    Diagnosis Line Sinus tachycardia  Right bundle branch block  Abnormal ECG    < end of copied text >      RADIOLOGY  < from: Xray Chest 1 View- PORTABLE-Urgent (Xray Chest 1 View- PORTABLE-Urgent .) (11.23.20 @ 11:49) >    Lung parenchyma/Pleura: Previously seen right midlung nodular opacity is not visualized on this exam. No focal consolidation. No pneumothorax.    Skeleton/soft tissues: Stable.    Impression:    Retracted ET tube, now in proper position.    < end of copied text >    MEDICATIONS  (STANDING):  chlorhexidine 0.12% Liquid 15 milliLiter(s) Oral Mucosa two times a day  chlorhexidine 4% Liquid 1 Application(s) Topical <User Schedule>  CRRT Treatment    <Continuous>  dextrose 5%. 1000 milliLiter(s) (75 mL/Hr) IV Continuous <Continuous>  DOPamine Infusion 35 MICROgram(s)/kG/Min (93.8 mL/Hr) IV Continuous <Continuous>  hydrocortisone sodium succinate Injectable 50 milliGRAM(s) IV Push every 8 hours  insulin regular Infusion 10 Unit(s)/Hr (10 mL/Hr) IV Continuous <Continuous>  lactated ringers Bolus 250 milliLiter(s) IV Bolus once  meropenem  IVPB 500 milliGRAM(s) IV Intermittent every 12 hours  midazolam Infusion 0.02 mG/kG/Hr (1.43 mL/Hr) IV Continuous <Continuous>  norepinephrine Infusion 0.05 MICROgram(s)/kG/Min (3.35 mL/Hr) IV Continuous <Continuous>  norepinephrine Infusion 0.05 MICROgram(s)/kG/Min (3.52 mL/Hr) IV Continuous <Continuous>  pantoprazole  Injectable 40 milliGRAM(s) IV Push two times a day  PureFlow Dialysate RFP-400 (K 2 / Ca 3) 5000 milliLiter(s) (2000 mL/Hr) CRRT <Continuous>  sodium bicarbonate  Infusion 0.157 mEq/kG/Hr (75 mL/Hr) IV Continuous <Continuous>  vasopressin Infusion 0.04 Unit(s)/Min (2.4 mL/Hr) IV Continuous <Continuous>    MEDICATIONS  (PRN):  acetaminophen  Suppository .. 650 milliGRAM(s) Rectal every 4 hours PRN Temp greater or equal to 38.5C (101.3F)     Patient is unresponsive off sedation       T(F): 101.8 (11-23-20 @ 11:00), Max: 102 (11-23-20 @ 08:00)  HR: 123 (11-23-20 @ 12:05)  BP: 124/87 (11-22-20 @ 23:17)  RR: 30 (11-23-20 @ 11:00)  SpO2: 88% (11-23-20 @ 12:05) (62% - 100%)    PHYSICAL EXAM:  GENERAL: NAD  HEAD:  Atraumatic, Normocephalic  NERVOUS SYSTEM:  positive gag  CHEST/LUNG: Clear to percussion bilaterally; No rales, rhonchi, wheezing, or rubs  HEART: Regular rate and rhythm; No murmurs, rubs, or gallops  ABDOMEN: Soft, Nontender, Nondistended; Bowel sounds present  EXTREMITIES:  2+ Peripheral Pulses, No clubbing, cyanosis, or edema  LYMPH: No lymphadenopathy noted  SKIN: No rashes or lesions    LABS  11-23    160<H>  |  125<H>  |  61<HH>  ----------------------------<  174<H>  5.6<H>   |  8<LL>  |  4.0<H>    Ca    7.2<L>      23 Nov 2020 11:30  Phos  9.8     11-23  Mg     3.1     11-23    TPro  4.3<L>  /  Alb  2.2<L>  /  TBili  1.1  /  DBili  x   /  AST  >700<H>  /  ALT  314<H>  /  AlkPhos  108  11-23                          16.4   22.75 )-----------( 69       ( 23 Nov 2020 05:46 )             47.1     PT/INR - ( 22 Nov 2020 13:57 )   PT: 13.20 sec;   INR: 1.15 ratio         PTT - ( 22 Nov 2020 13:57 )  PTT:33.5 sec  Mode: Auto Mode: PRVC/ Volume Support  RR (machine): 30  TV (machine): 400  FiO2: 100    CARDIAC ENZYMES  Creatine Kinase, Serum: 1760 (11-23 @ 11:30)  Creatine Kinase, Serum: 10854 (11-22 @ 19:54)  Creatine Kinase, Serum: 335 (11-22 @ 13:50)      Troponin T, Serum: 0.67 ng/mL (11-23-20 @ 11:30)  Troponin T, Serum: <0.01 ng/mL (11-22-20 @ 13:57)    < from: 12 Lead ECG (11.22.20 @ 16:23) >    Diagnosis Line Sinus tachycardia  Right bundle branch block  Abnormal ECG    < end of copied text >      RADIOLOGY  < from: Xray Chest 1 View- PORTABLE-Urgent (Xray Chest 1 View- PORTABLE-Urgent .) (11.23.20 @ 11:49) >    Lung parenchyma/Pleura: Previously seen right midlung nodular opacity is not visualized on this exam. No focal consolidation. No pneumothorax.    Skeleton/soft tissues: Stable.    Impression:    Retracted ET tube, now in proper position.    < end of copied text >    MEDICATIONS  (STANDING):  chlorhexidine 0.12% Liquid 15 milliLiter(s) Oral Mucosa two times a day  chlorhexidine 4% Liquid 1 Application(s) Topical <User Schedule>  CRRT Treatment    <Continuous>  dextrose 5%. 1000 milliLiter(s) (75 mL/Hr) IV Continuous <Continuous>  DOPamine Infusion 35 MICROgram(s)/kG/Min (93.8 mL/Hr) IV Continuous <Continuous>  hydrocortisone sodium succinate Injectable 50 milliGRAM(s) IV Push every 8 hours  insulin regular Infusion 10 Unit(s)/Hr (10 mL/Hr) IV Continuous <Continuous>  lactated ringers Bolus 250 milliLiter(s) IV Bolus once  meropenem  IVPB 500 milliGRAM(s) IV Intermittent every 12 hours  midazolam Infusion 0.02 mG/kG/Hr (1.43 mL/Hr) IV Continuous <Continuous>  norepinephrine Infusion 0.05 MICROgram(s)/kG/Min (3.35 mL/Hr) IV Continuous <Continuous>  norepinephrine Infusion 0.05 MICROgram(s)/kG/Min (3.52 mL/Hr) IV Continuous <Continuous>  pantoprazole  Injectable 40 milliGRAM(s) IV Push two times a day  PureFlow Dialysate RFP-400 (K 2 / Ca 3) 5000 milliLiter(s) (2000 mL/Hr) CRRT <Continuous>  sodium bicarbonate  Infusion 0.157 mEq/kG/Hr (75 mL/Hr) IV Continuous <Continuous>  vasopressin Infusion 0.04 Unit(s)/Min (2.4 mL/Hr) IV Continuous <Continuous>    MEDICATIONS  (PRN):  acetaminophen  Suppository .. 650 milliGRAM(s) Rectal every 4 hours PRN Temp greater or equal to 38.5C (101.3F)

## 2020-11-23 NOTE — PROGRESS NOTE ADULT - SUBJECTIVE AND OBJECTIVE BOX
Patient is a 52y old  Female who presents with a chief complaint of cardiac arrest (23 Nov 2020 07:12)        Over Night Events: Now on 3 pressors, on MV. No urine output        ROS:  See HPI    PHYSICAL EXAM    ICU Vital Signs Last 24 Hrs  T(C): 38.9 (23 Nov 2020 08:00), Max: 38.9 (23 Nov 2020 08:00)  T(F): 102 (23 Nov 2020 08:00), Max: 102 (23 Nov 2020 08:00)  HR: 123 (23 Nov 2020 09:40) (33 - 123)  BP: 124/87 (22 Nov 2020 23:17) (55/38 - 178/115)  BP(mean): 101 (22 Nov 2020 23:17) (43 - 138)  ABP: 123/54 (23 Nov 2020 09:40) (84/58 - 152/53)  ABP(mean): 71 (23 Nov 2020 09:40) (51 - 85)  RR: 20 (23 Nov 2020 07:01) (20 - 33)  SpO2: 94% (23 Nov 2020 08:30) (62% - 100%)      General: NAD  HEENT: + ETT             Lymph Nodes: No cervical LN   Lungs: Bilateral BS  Cardiovascular: Regular   Abdomen: Soft, Positive BS  Extremities: No clubbing   Skin: mottled  Neurological: + gag, + cough, does not respond respond to painful stimuli      11-22-20 @ 07:01  -  11-23-20 @ 07:00  --------------------------------------------------------  IN:    dextrose 5% w/ Additives: 400 mL    DOPamine Infusion: 98.6 mL    DOPamine Infusion: 1118.7 mL    Insulin: 15 mL    Insulin: 120 mL    Insulin: 45 mL    IV PiggyBack: 100 mL    Lactated Ringers: 250 mL    Midazolam: 25.2 mL    Norepinephrine: 1450 mL    sodium chloride 0.9%: 1750 mL    sodium chloride 0.9%: 150 mL    sodium chloride 0.9% w/ Additives: 1000 mL    Vasopressin: 26.4 mL  Total IN: 6548.9 mL    OUT:    Indwelling Catheter - Urethral (mL): 780 mL    Nasogastric/Oral tube (mL): 250 mL  Total OUT: 1030 mL    Total NET: 5518.9 mL      11-23-20 @ 07:01  -  11-23-20 @ 10:03  --------------------------------------------------------  IN:    DOPamine Infusion: 50 mL    Insulin: 20 mL    Lactated Ringers: 500 mL    Midazolam: 6.6 mL    Norepinephrine: 250 mL    Vasopressin: 4.8 mL  Total IN: 831.4 mL    OUT:    Indwelling Catheter - Urethral (mL): 10 mL  Total OUT: 10 mL    Total NET: 821.4 mL          LABS:                            16.4   22.75 )-----------( 69       ( 23 Nov 2020 05:46 )             47.1                                               11-23    158<H>  |  118<H>  |  64<HH>  ----------------------------<  543<HH>  4.1   |  13<L>  |  3.6<H>    Ca    7.7<L>      23 Nov 2020 05:46  Phos  8.5     11-23  Mg     3.4     11-23    TPro  4.6<L>  /  Alb  2.5<L>  /  TBili  0.9  /  DBili  x   /  AST  1364<H>  /  ALT  296<H>  /  AlkPhos  111  11-23      PT/INR - ( 22 Nov 2020 13:57 )   PT: 13.20 sec;   INR: 1.15 ratio         PTT - ( 22 Nov 2020 13:57 )  PTT:33.5 sec                                           CARDIAC MARKERS ( 22 Nov 2020 19:54 )  x     / x     / 14863 U/L / x     / x      CARDIAC MARKERS ( 22 Nov 2020 13:57 )  x     / <0.01 ng/mL / x     / x     / x      CARDIAC MARKERS ( 22 Nov 2020 13:50 )  x     / x     / 335 U/L / x     / x        LIVER FUNCTIONS - ( 23 Nov 2020 05:46 )  Alb: 2.5 g/dL / Pro: 4.6 g/dL / ALK PHOS: 111 U/L / ALT: 296 U/L / AST: 1364 U/L / GGT: x                                                                                               Mode: Auto Mode: PRVC/ Volume Support  RR (machine): 20  TV (machine): 400  FiO2: 100  PEEP: 8  ITime: 1  MAP: 14  PIP: 19                                      ABG - ( 23 Nov 2020 04:08 )  pH, Arterial: 6.99  pH, Blood: x     /  pCO2: 32    /  pO2: 54    / HCO3: 8     / Base Excess: -23.1 /  SaO2: 85                  MEDICATIONS  (STANDING):  chlorhexidine 0.12% Liquid 15 milliLiter(s) Oral Mucosa two times a day  chlorhexidine 4% Liquid 1 Application(s) Topical <User Schedule>  DOPamine Infusion 35 MICROgram(s)/kG/Min (93.8 mL/Hr) IV Continuous <Continuous>  hydrocortisone sodium succinate Injectable 50 milliGRAM(s) IV Push every 8 hours  insulin regular Infusion 20 Unit(s)/Hr (20 mL/Hr) IV Continuous <Continuous>  lactated ringers. 1000 milliLiter(s) (250 mL/Hr) IV Continuous <Continuous>  meropenem  IVPB 500 milliGRAM(s) IV Intermittent every 12 hours  midazolam Infusion 0.02 mG/kG/Hr (1.43 mL/Hr) IV Continuous <Continuous>  norepinephrine Infusion 0.05 MICROgram(s)/kG/Min (3.35 mL/Hr) IV Continuous <Continuous>  norepinephrine Infusion 0.05 MICROgram(s)/kG/Min (3.52 mL/Hr) IV Continuous <Continuous>  pantoprazole  Injectable 40 milliGRAM(s) IV Push two times a day  vasopressin Infusion 0.04 Unit(s)/Min (2.4 mL/Hr) IV Continuous <Continuous>    MEDICATIONS  (PRN):  acetaminophen  Suppository .. 650 milliGRAM(s) Rectal every 4 hours PRN Temp greater or equal to 38.5C (101.3F)      Xrays:  No Acute cardiopulmonary disease, +ETT low, OGT OK                                                                                   ECHO     Patient is a 52y old  Female who presents with a chief complaint of cardiac arrest (23 Nov 2020 07:12)        Over Night Events: Now on 3 pressors, on MV. No urine output        ROS:  See HPI    PHYSICAL EXAM    ICU Vital Signs Last 24 Hrs  T(C): 38.9 (23 Nov 2020 08:00), Max: 38.9 (23 Nov 2020 08:00)  T(F): 102 (23 Nov 2020 08:00), Max: 102 (23 Nov 2020 08:00)  HR: 123 (23 Nov 2020 09:40) (33 - 123)  BP: 124/87 (22 Nov 2020 23:17) (55/38 - 178/115)  BP(mean): 101 (22 Nov 2020 23:17) (43 - 138)  ABP: 123/54 (23 Nov 2020 09:40) (84/58 - 152/53)  ABP(mean): 71 (23 Nov 2020 09:40) (51 - 85)  RR: 20 (23 Nov 2020 07:01) (20 - 33)  SpO2: 94% (23 Nov 2020 08:30) (62% - 100%)      General: NAD  HEENT: + ETT             Lymph Nodes: No cervical LN   Lungs: Bilateral BS  Cardiovascular: Regular   Abdomen: Soft, Positive BS  Extremities: No clubbing   Skin: mottled  Neurological: + gag, + cough, does not respond respond to painful stimuli      11-22-20 @ 07:01  -  11-23-20 @ 07:00  --------------------------------------------------------  IN:    dextrose 5% w/ Additives: 400 mL    DOPamine Infusion: 98.6 mL    DOPamine Infusion: 1118.7 mL    Insulin: 15 mL    Insulin: 120 mL    Insulin: 45 mL    IV PiggyBack: 100 mL    Lactated Ringers: 250 mL    Midazolam: 25.2 mL    Norepinephrine: 1450 mL    sodium chloride 0.9%: 1750 mL    sodium chloride 0.9%: 150 mL    sodium chloride 0.9% w/ Additives: 1000 mL    Vasopressin: 26.4 mL  Total IN: 6548.9 mL    OUT:    Indwelling Catheter - Urethral (mL): 780 mL    Nasogastric/Oral tube (mL): 250 mL  Total OUT: 1030 mL    Total NET: 5518.9 mL      11-23-20 @ 07:01  -  11-23-20 @ 10:03  --------------------------------------------------------  IN:    DOPamine Infusion: 50 mL    Insulin: 20 mL    Lactated Ringers: 500 mL    Midazolam: 6.6 mL    Norepinephrine: 250 mL    Vasopressin: 4.8 mL  Total IN: 831.4 mL    OUT:    Indwelling Catheter - Urethral (mL): 10 mL  Total OUT: 10 mL    Total NET: 821.4 mL          LABS:                            16.4   22.75 )-----------( 69       ( 23 Nov 2020 05:46 )             47.1                                               11-23    158<H>  |  118<H>  |  64<HH>  ----------------------------<  543<HH>  4.1   |  13<L>  |  3.6<H>    Ca    7.7<L>      23 Nov 2020 05:46  Phos  8.5     11-23  Mg     3.4     11-23    TPro  4.6<L>  /  Alb  2.5<L>  /  TBili  0.9  /  DBili  x   /  AST  1364<H>  /  ALT  296<H>  /  AlkPhos  111  11-23      PT/INR - ( 22 Nov 2020 13:57 )   PT: 13.20 sec;   INR: 1.15 ratio         PTT - ( 22 Nov 2020 13:57 )  PTT:33.5 sec                                           CARDIAC MARKERS ( 22 Nov 2020 19:54 )  x     / x     / 95876 U/L / x     / x      CARDIAC MARKERS ( 22 Nov 2020 13:57 )  x     / <0.01 ng/mL / x     / x     / x      CARDIAC MARKERS ( 22 Nov 2020 13:50 )  x     / x     / 335 U/L / x     / x        LIVER FUNCTIONS - ( 23 Nov 2020 05:46 )  Alb: 2.5 g/dL / Pro: 4.6 g/dL / ALK PHOS: 111 U/L / ALT: 296 U/L / AST: 1364 U/L / GGT: x                                                                                               Mode: Auto Mode: PRVC/ Volume Support  RR (machine): 20  TV (machine): 400  FiO2: 100  PEEP: 8  ITime: 1  MAP: 14  PIP: 19                                      ABG - ( 23 Nov 2020 04:08 )  pH, Arterial: 6.99  pH, Blood: x     /  pCO2: 32    /  pO2: 54    / HCO3: 8     / Base Excess: -23.1 /  SaO2: 85                  MEDICATIONS  (STANDING):  chlorhexidine 0.12% Liquid 15 milliLiter(s) Oral Mucosa two times a day  chlorhexidine 4% Liquid 1 Application(s) Topical <User Schedule>  DOPamine Infusion 35 MICROgram(s)/kG/Min (93.8 mL/Hr) IV Continuous <Continuous>  hydrocortisone sodium succinate Injectable 50 milliGRAM(s) IV Push every 8 hours  insulin regular Infusion 20 Unit(s)/Hr (20 mL/Hr) IV Continuous <Continuous>  lactated ringers. 1000 milliLiter(s) (250 mL/Hr) IV Continuous <Continuous>  meropenem  IVPB 500 milliGRAM(s) IV Intermittent every 12 hours  midazolam Infusion 0.02 mG/kG/Hr (1.43 mL/Hr) IV Continuous <Continuous>  norepinephrine Infusion 0.05 MICROgram(s)/kG/Min (3.35 mL/Hr) IV Continuous <Continuous>  norepinephrine Infusion 0.05 MICROgram(s)/kG/Min (3.52 mL/Hr) IV Continuous <Continuous>  pantoprazole  Injectable 40 milliGRAM(s) IV Push two times a day  vasopressin Infusion 0.04 Unit(s)/Min (2.4 mL/Hr) IV Continuous <Continuous>    MEDICATIONS  (PRN):  acetaminophen  Suppository .. 650 milliGRAM(s) Rectal every 4 hours PRN Temp greater or equal to 38.5C (101.3F)      Xrays:  No Acute cardiopulmonary disease, +ETT low, OGT OK                                                                                   ECHO

## 2020-11-23 NOTE — CONSULT NOTE ADULT - ASSESSMENT
This is an incomplete consult note.  Full note to follow       52F PMH HLD, sarcoidosis, admitted s/p cardiac arrest with ROSC, fever, leukocytosis, hyperglycemia c/f DKA vs. HHS, anuric NAVIN.    # NAVIN - unknown creatinine baseline  - ATN iso cardiac arrest, probably had pre-renal component due to vomiting/polyuria prior to arrest    - anuric, creatinine stable since admission s/p multiple iv fluid boluses  - CPK > 12K c/f rhabdomylosis This is an incomplete consult note.  Full note to follow       52F PMH HLD, sarcoidosis, admitted s/p cardiac arrest with ROSC, fever, leukocytosis, hyperglycemia c/f DKA vs. HHS, anuric NAVIN.    - NAVIN - unknown creatinine baseline  - ATN iso cardiac arrest, probably had pre-renal component due to vomiting/polyuria prior to arrest    - anuric, creatinine stable since admission s/p multiple iv fluid boluses  - CPK > 12K c/f rhabdomylosis  - high anion gap metabolic acidosis / lactic acidosis / possible DKA/starvation, on hypotonic bicarb gtt  - hyperphosphatemia   52F PMH HLD, sarcoidosis, admitted s/p cardiac arrest with ROSC, fever, leukocytosis, hyperglycemia c/f DKA vs. HHS, anuric NAVIN. Respiratory failure requiring mechanical ventilation and shock requiring multiple pressors.      # NAVIN - unknown creatinine baseline  - ATN iso cardiac arrest, pre-renal component due to vomiting/polyuria prior to arrest    - anuric, creatinine stable since admission s/p multiple crystalloid boluses  - CPK > 12K c/f rhabdomylosis  # high anion gap metabolic acidosis / lactic acidosis / possible DKA/starvation, on hypotonic bicarb gtt  # hyperphosphatemia  # hypernatremia - iatrogenic due to multiple amps of sodium bicarb   # hyperglycemia  # Shock septic/cardiogenic - requiring Levophed and Vasopressin at stable doses over past few hours  Recs:  - temporary catheter placement for CRRT, consent for RRT obtained from son at bedside, CVVHD order placed, net even  - monitor electrolytes TID, maintain K 4, iCa 1, phos 2, Mg 2  - obtain UA, urine pr/cr ratio, urine Na, urine creatinine if able  - obtain renal bladder ultrasound  - d/c bicarb gtt once RRT started   - document strict i/o and daily weights  - TTE, cardio f/u  - f/u cxs, dose abx for continuous RRT   - insulin gtt, monitor bg q1h  D/w ICU attending/house staff   Prognosis guarded  Will follow

## 2020-11-23 NOTE — CONSULT NOTE ADULT - ATTENDING COMMENTS
metabolic acidemia is due to lactic acidosis, not diabetes, in any case continue on insulin therapy for now, as well as all other supportive care.

## 2020-11-23 NOTE — PROGRESS NOTE ADULT - ASSESSMENT
IMPRESSION:    Septic vs cardiogenic shock  Acute respiratory failure s/p intubation  Cardiac arrest, ROSC after 45 mins  Severe metabolic acidosis  NAVIN  Rhabdomyolysis  Hypernatremia  DKA  Coffee ground emesis from OG tube  HO Sarcoidosis    Plan:    CNS: Avoid sedation    HEENT: Oral care    PULMONARY:  HOB @ 45 degrees.  Vent changes as follows: PEEP 10, RR 24 FiO2 100. Serial ABGs    CARDIOVASCULAR: Wean dopamine, Continue levophed and vaso Target MAP 65,   Check 2 echo   [place]   Start bicarb at 75cc/hr   D5W At 100/hr    GI: GI prophylaxis.  OGT to suction, monitor CBC    RENAL: Nephrology consult appreciated, will start on CVVHD    INFECTIOUS DISEASE: Follow up cultures. Continue meropenem, f/u vancomycin level    HEMATOLOGICAL:  SCDs DVT prophylaxis. Check LE duplex    ENDOCRINE:  Follow up FS.  Insulin drip    MUSCULOSKELETAL: Bedrest    Overall prognosis grave for neurologic recovery.  Family at bedside aware

## 2020-11-23 NOTE — CONSULT NOTE ADULT - SUBJECTIVE AND OBJECTIVE BOX
CARDIOLOGY CONSULT NOTE     CHIEF COMPLAINT/REASON FOR CONSULT:    HPI:  52 year old female with PMH of hyperlipidemia, Sarcoidosis?, and anxiety BIBEMS after she was found unresponsive in the floor for unknown duration.   per EMS, presents unresponsive from home on the bathroom floor PTA. Pt was last spoken to last night and was in normal state of health. This morning after she didn't answer the phone, her friend went over and found her unresponsive on the bathroom floor; unsure how long she was down for. No evidence of drugs or alcohol. EMS was called and gave 2 of NARCAN on route. On arrival pt with oral airway in place with shallow respirations. No BP recorded on route, pupils fixed and dilated, pt george to 50s, FS too high to measure. Pt was intubated for airway protection and anticipated clinical course. Coffee ground emesis noted in OP during intubation; intubated without medication and with direct visualization and color change. At that time pt was started on Levophed and a R femoral line was placed. After tubed, no pulse appreciated and CPR was initiated, initial rhythm PA. Multiple rounds of Eppy given with BiCARB. Pt began pouring coffee ground blood from mouth, given TXA and 2 packs of RBC for possible GI bleed. Pt converted to Vfib, shocked 3x to 200, given Eppy and Mg with ROSC, noted to be george to the 40s, given Atropine and restarted on Levophed and Dopamine to add BP support. OG tube placed. Pt seized, given 2mg Ativan.   In ED patient was found to be in DKA/HHS picture with NAVIN, she moves her extremities and responds to painful stimuli after cardiac arrest.    (22 Nov 2020 15:21)      PAST MEDICAL & SURGICAL HISTORY:  Anxiety    High cholesterol    Sarcoidosis    No significant past surgical history        Cardiac Risks:   [ ]HTN, [x ] DM, [ ] Smoking, [ ] FH,  [x ] Lipids        MEDICATIONS:  MEDICATIONS  (STANDING):  chlorhexidine 0.12% Liquid 15 milliLiter(s) Oral Mucosa two times a day  chlorhexidine 4% Liquid 1 Application(s) Topical <User Schedule>  DOPamine Infusion 35 MICROgram(s)/kG/Min (93.8 mL/Hr) IV Continuous <Continuous>  hydrocortisone sodium succinate Injectable 50 milliGRAM(s) IV Push every 8 hours  insulin regular Infusion 20 Unit(s)/Hr (20 mL/Hr) IV Continuous <Continuous>  lactated ringers. 1000 milliLiter(s) (250 mL/Hr) IV Continuous <Continuous>  meropenem  IVPB 500 milliGRAM(s) IV Intermittent every 12 hours  midazolam Infusion 0.02 mG/kG/Hr (1.43 mL/Hr) IV Continuous <Continuous>  norepinephrine Infusion 0.05 MICROgram(s)/kG/Min (3.35 mL/Hr) IV Continuous <Continuous>  norepinephrine Infusion 0.05 MICROgram(s)/kG/Min (3.52 mL/Hr) IV Continuous <Continuous>  pantoprazole  Injectable 40 milliGRAM(s) IV Push two times a day  sodium chloride 0.9% 1000 milliLiter(s) (500 mL/Hr) IV Continuous <Continuous>  vasopressin Infusion 0.04 Unit(s)/Min (2.4 mL/Hr) IV Continuous <Continuous>      FAMILY HISTORY:      SOCIAL HISTORY:      [ ] Marital status Single  Allergies    Ambien (Other)  codeine (Other)  hydrocodone (Other)        	    REVIEW OF SYSTEMS:  CONSTITUTIONAL: No fever, weight loss, or fatigue  EYES: No eye pain, visual disturbances, or discharge  ENMT:  No difficulty hearing, tinnitus, vertigo; No sinus or throat pain  NECK: No pain or stiffness  RESPIRATORY: No cough, wheezing, chills or hemoptysis; No Shortness of Breath  CARDIOVASCULAR: No chest pain, palpitations, passing out, dizziness, or leg swelling  GASTROINTESTINAL: No abdominal or epigastric pain. No nausea, vomiting, or hematemesis; No diarrhea or constipation. No melena or hematochezia.  GENITOURINARY: No dysuria, frequency, hematuria, or incontinence  NEUROLOGICAL: No headaches, memory loss, loss of strength, numbness, or tremors  SKIN: No itching, burning, rashes, or lesions   	        PHYSICAL EXAM:  T(C): 38.7 (11-23-20 @ 06:00), Max: 38.8 (11-23-20 @ 04:00)  HR: 120 (11-23-20 @ 06:00) (33 - 120)  BP: 124/87 (11-22-20 @ 23:17) (55/38 - 178/115)  RR: 33 (11-23-20 @ 00:00) (20 - 33)  SpO2: 86% (11-23-20 @ 06:00) (62% - 100%)  Wt(kg): --  I&O's Summary    22 Nov 2020 07:01  -  23 Nov 2020 07:00  --------------------------------------------------------  IN: 6548.9 mL / OUT: 1030 mL / NET: 5518.9 mL        Appearance: Normal	  Psychiatry: A & O x 3, Mood & affect appropriate  HEENT:   Normal oral mucosa, PERRL, EOMI	  Lymphatic: No lymphadenopathy  Cardiovascular: Normal S1 S2,RRR, No JVD, No murmurs  Respiratory: Lungs clear to auscultation	  Gastrointestinal:  Soft, Non-tender, + BS	  Skin: No rashes, No ecchymoses, No cyanosis	  Neurologic: Non-focal  Extremities: Normal range of motion, No clubbing, cyanosis or edema  Vascular: Peripheral pulses palpable 2+ bilaterally      ECG:  	sinus av dissociation rbbb    	  LABS:	 	    CARDIAC MARKERS:          Serum Pro-Brain Natriuretic Peptide: 1033 pg/mL (11-22 @ 13:57)                            16.8   23.40 )-----------( 138      ( 22 Nov 2020 21:51 )             50.7     11-23    151<H>  |  117<H>  |  66<HH>  ----------------------------<  824<HH>  4.0   |  11<L>  |  3.6<H>    Ca    8.4<L>      23 Nov 2020 02:19  Phos  7.8     11-22  Mg     7.5     11-22    TPro  4.7<L>  /  Alb  2.6<L>  /  TBili  0.4  /  DBili  x   /  AST  365<H>  /  ALT  239<H>  /  AlkPhos  79  11-22    PT/INR - ( 22 Nov 2020 13:57 )   PT: 13.20 sec;   INR: 1.15 ratio         PTT - ( 22 Nov 2020 13:57 )  PTT:33.5 sec  proBNP: Serum Pro-Brain Natriuretic Peptide: 1033 pg/mL (11-22 @ 13:57)               CARDIOLOGY CONSULT NOTE     CHIEF COMPLAINT/REASON FOR CONSULT:    HPI:  52 year old female with PMH of hyperlipidemia, Sarcoidosis?, and anxiety BIBEMS after she was found unresponsive in the floor for unknown duration.   per EMS, presents unresponsive from home on the bathroom floor PTA. Pt was last spoken to last night and was in normal state of health. This morning after she didn't answer the phone, her friend went over and found her unresponsive on the bathroom floor; unsure how long she was down for. No evidence of drugs or alcohol. EMS was called and gave 2 of NARCAN on route. On arrival pt with oral airway in place with shallow respirations. No BP recorded on route, pupils fixed and dilated, pt george to 50s, FS too high to measure. Pt was intubated for airway protection and anticipated clinical course. Coffee ground emesis noted in OP during intubation; intubated without medication and with direct visualization and color change. At that time pt was started on Levophed and a R femoral line was placed. After tubed, no pulse appreciated and CPR was initiated, initial rhythm PA. Multiple rounds of Eppy given with BiCARB. Pt began pouring coffee ground blood from mouth, given TXA and 2 packs of RBC for possible GI bleed. Pt converted to Vfib, shocked 3x to 200, given Eppy and Mg with ROSC, noted to be george to the 40s, given Atropine and restarted on Levophed and Dopamine to add BP support. OG tube placed. Pt seized, given 2mg Ativan.   In ED patient was found to be in DKA/HHS picture with NAVIN, she moves her extremities and responds to painful stimuli after cardiac arrest.    (22 Nov 2020 15:21)      PAST MEDICAL & SURGICAL HISTORY:  Anxiety    High cholesterol    Sarcoidosis    No significant past surgical history        Cardiac Risks:   [ ]HTN, [x ] DM, [ ] Smoking, [ ] FH,  [x ] Lipids        MEDICATIONS:  MEDICATIONS  (STANDING):  chlorhexidine 0.12% Liquid 15 milliLiter(s) Oral Mucosa two times a day  chlorhexidine 4% Liquid 1 Application(s) Topical <User Schedule>  DOPamine Infusion 35 MICROgram(s)/kG/Min (93.8 mL/Hr) IV Continuous <Continuous>  hydrocortisone sodium succinate Injectable 50 milliGRAM(s) IV Push every 8 hours  insulin regular Infusion 20 Unit(s)/Hr (20 mL/Hr) IV Continuous <Continuous>  lactated ringers. 1000 milliLiter(s) (250 mL/Hr) IV Continuous <Continuous>  meropenem  IVPB 500 milliGRAM(s) IV Intermittent every 12 hours  midazolam Infusion 0.02 mG/kG/Hr (1.43 mL/Hr) IV Continuous <Continuous>  norepinephrine Infusion 0.05 MICROgram(s)/kG/Min (3.35 mL/Hr) IV Continuous <Continuous>  norepinephrine Infusion 0.05 MICROgram(s)/kG/Min (3.52 mL/Hr) IV Continuous <Continuous>  pantoprazole  Injectable 40 milliGRAM(s) IV Push two times a day  sodium chloride 0.9% 1000 milliLiter(s) (500 mL/Hr) IV Continuous <Continuous>  vasopressin Infusion 0.04 Unit(s)/Min (2.4 mL/Hr) IV Continuous <Continuous>      FAMILY HISTORY:      SOCIAL HISTORY:      [ ] Marital status Single  Allergies    Ambien (Other)  codeine (Other)  hydrocodone (Other)        	    REVIEW OF SYSTEMS:  CONSTITUTIONAL: No fever, weight loss, or fatigue  EYES: No eye pain, visual disturbances, or discharge  ENMT:  No difficulty hearing, tinnitus, vertigo; No sinus or throat pain  NECK: No pain or stiffness  RESPIRATORY: No cough, wheezing, chills or hemoptysis; No Shortness of Breath  CARDIOVASCULAR: No chest pain, palpitations, passing out, dizziness, or leg swelling  GASTROINTESTINAL: No abdominal or epigastric pain. No nausea, vomiting, or hematemesis; No diarrhea or constipation. No melena or hematochezia.  GENITOURINARY: No dysuria, frequency, hematuria, or incontinence  NEUROLOGICAL: Not responsive  SKIN: No itching, burning, rashes, or lesions   	        PHYSICAL EXAM:  T(C): 38.7 (11-23-20 @ 06:00), Max: 38.8 (11-23-20 @ 04:00)  HR: 120 (11-23-20 @ 06:00) (33 - 120)  BP: 124/87 (11-22-20 @ 23:17) (55/38 - 178/115)  RR: 33 (11-23-20 @ 00:00) (20 - 33)  SpO2: 86% (11-23-20 @ 06:00) (62% - 100%)  Wt(kg): --  I&O's Summary    22 Nov 2020 07:01 - 23 Nov 2020 07:00  --------------------------------------------------------  IN: 6548.9 mL / OUT: 1030 mL / NET: 5518.9 mL        Appearance: Normal	  Psychiatry: not responsive  HEENT:   Normal oral mucosa, PERRL, EOMI	  Lymphatic: No lymphadenopathy  Cardiovascular: Normal S1 S2,RRR, No JVD, No murmurs  Respiratory: Lungs clear to auscultation	  Gastrointestinal:  Soft, Non-tender, + BS	  Skin: No rashes, No ecchymoses, No cyanosis	  Neurologic: Non-focal  Extremities: Normal range of motion, No clubbing, cyanosis or edema  Vascular: Peripheral pulses palpable 2+ bilaterally      ECG:  	sinus av dissociation rbbb    	  LABS:	 	    CARDIAC MARKERS:          Serum Pro-Brain Natriuretic Peptide: 1033 pg/mL (11-22 @ 13:57)                            16.8   23.40 )-----------( 138      ( 22 Nov 2020 21:51 )             50.7     11-23    151<H>  |  117<H>  |  66<HH>  ----------------------------<  824<HH>  4.0   |  11<L>  |  3.6<H>    Ca    8.4<L>      23 Nov 2020 02:19  Phos  7.8     11-22  Mg     7.5     11-22    TPro  4.7<L>  /  Alb  2.6<L>  /  TBili  0.4  /  DBili  x   /  AST  365<H>  /  ALT  239<H>  /  AlkPhos  79  11-22    PT/INR - ( 22 Nov 2020 13:57 )   PT: 13.20 sec;   INR: 1.15 ratio         PTT - ( 22 Nov 2020 13:57 )  PTT:33.5 sec  proBNP: Serum Pro-Brain Natriuretic Peptide: 1033 pg/mL (11-22 @ 13:57)

## 2020-11-23 NOTE — PROCEDURE NOTE - NSPROCDETAILS_GEN_ALL_CORE
guidewire recovered/sterile dressing applied/sterile technique, catheter placed/ultrasound guidance/lumen(s) aspirated and flushed
positive blood return obtained via catheter/Seldinger technique/all materials/supplies accounted for at end of procedure/location identified, draped/prepped, sterile technique used, needle inserted/introduced/connected to a pressurized flush line/sutured in place

## 2020-11-23 NOTE — CONSULT NOTE ADULT - SUBJECTIVE AND OBJECTIVE BOX
NEPHROLOGY CONSULTATION NOTE    SABINO HERNANDEZ  52y  Female  MRN-[id number]    CC:   Patient is a 52y old  Female who presents with a chief complaint of cardiac arrest (2020 10:03)      HPI:  52 year old female with PMH of hyperlipidemia, Sarcoidosis?, and anxiety BIBEMS after she was found unresponsive in the floor for unknown duration.   per EMS, presents unresponsive from home on the bathroom floor PTA. Pt was last spoken to last night and was in normal state of health. This morning after she didn't answer the phone, her friend went over and found her unresponsive on the bathroom floor; unsure how long she was down for. No evidence of drugs or alcohol. EMS was called and gave 2 of NARCAN on route. On arrival pt with oral airway in place with shallow respirations. No BP recorded on route, pupils fixed and dilated, pt george to 50s, FS too high to measure. Pt was intubated for airway protection and anticipated clinical course. Coffee ground emesis noted in OP during intubation; intubated without medication and with direct visualization and color change. At this time pt was started on Levophed and a R femoral line was placed. After tubed, no pulse appreciated and CPR was initiated, initial rhythm PA. Multiple rounds of Eppy given with BiCARB. Pt began pouring coffee ground blood from mouth, given TXA and 2 packs of RBC for possible GI bleed. Pt converted to Vfib, shocked 3x to 200, given Eppy and Mg with ROSC, noted to be george to the 40s, given Atropine and restarted on Levophed and Dopamine to add BP support. OG tube placed. Pt seized, given 2mg Ativan.   In ED patient was found to be in DKA/HHS picture with NAVIN, she moves her extremities and responds to painful stimuli after cardiac arrest.    (2020 15:21)      PAST MEDICAL & SURGICAL HISTORY:  Anxiety    High cholesterol    Sarcoidosis    No significant past surgical history      Allergies:  Ambien (Other)  codeine (Other)  hydrocodone (Other)    Home Medications Reviewed  Hospital Medications:   MEDICATIONS  (STANDING):  chlorhexidine 0.12% Liquid 15 milliLiter(s) Oral Mucosa two times a day  chlorhexidine 4% Liquid 1 Application(s) Topical <User Schedule>  dextrose 5%. 1000 milliLiter(s) (75 mL/Hr) IV Continuous <Continuous>  DOPamine Infusion 35 MICROgram(s)/kG/Min (93.8 mL/Hr) IV Continuous <Continuous>  hydrocortisone sodium succinate Injectable 50 milliGRAM(s) IV Push every 8 hours  insulin regular Infusion 10 Unit(s)/Hr (10 mL/Hr) IV Continuous <Continuous>  meropenem  IVPB 500 milliGRAM(s) IV Intermittent every 12 hours  midazolam Infusion 0.02 mG/kG/Hr (1.43 mL/Hr) IV Continuous <Continuous>  norepinephrine Infusion 0.05 MICROgram(s)/kG/Min (3.35 mL/Hr) IV Continuous <Continuous>  norepinephrine Infusion 0.05 MICROgram(s)/kG/Min (3.52 mL/Hr) IV Continuous <Continuous>  pantoprazole  Injectable 40 milliGRAM(s) IV Push two times a day  sodium bicarbonate  Infusion 0.157 mEq/kG/Hr (75 mL/Hr) IV Continuous <Continuous>  vasopressin Infusion 0.04 Unit(s)/Min (2.4 mL/Hr) IV Continuous <Continuous>    MEDICATIONS  (PRN):  acetaminophen  Suppository .. 650 milliGRAM(s) Rectal every 4 hours PRN Temp greater or equal to 38.5C (101.3F)    Home medications:  Home Medications:  Crestor 5 mg oral tablet: 1 tab(s) orally once a day (at bedtime) (20 May 2018 19:00)  Lunesta 3 mg oral tablet: 1 tab(s) orally once a day (at bedtime) (20 May 2018 19:00)  Xanax 1 mg oral tablet: 1 tab(s) orally 3 times a day (20 May 2018 19:00)      SOCIAL HISTORY:  Social History:  unknown smoking status (2020 15:21)      FAMILY HISTORY:      REVIEW OF SYSTEMS:     All other review of systems is negative unless indicated above.    VITALS:  T(F): 101.8 (20 @ 11:00), Max: 102 (20 @ 08:00)  HR: 125 (20 @ 11:00)  BP: 124/87 (20 @ 23:17)  RR: 30 (20 @ 11:00)  SpO2: 76% (20 @ 10:00)  Mode: Auto Mode: PRVC/ Volume Support  RR (machine): 30  TV (machine): 400  FiO2: 100  PEEP: 10  ITime: 1  MAP: 14  PIP: 19    Height (cm): 165.1 ( @ 18:40)  Weight (kg): 71.5 ( @ 18:40)  BMI (kg/m2): 26.2 ( @ 18:40)  BSA (m2): 1.79 ( @ 18:40)  I&O's Detail    2020 07:  -  2020 07:00  --------------------------------------------------------  IN:    dextrose 5% w/ Additives: 400 mL    DOPamine Infusion: 98.6 mL    DOPamine Infusion: 1118.7 mL    Insulin: 15 mL    Insulin: 120 mL    Insulin: 45 mL    IV PiggyBack: 100 mL    Lactated Ringers: 250 mL    Midazolam: 25.2 mL    Norepinephrine: 1450 mL    sodium chloride 0.9%: 1750 mL    sodium chloride 0.9%: 150 mL    sodium chloride 0.9% w/ Additives: 1000 mL    Vasopressin: 26.4 mL  Total IN: 6548.9 mL    OUT:    Indwelling Catheter - Urethral (mL): 780 mL    Nasogastric/Oral tube (mL): 250 mL  Total OUT: 1030 mL    Total NET: 5518.9 mL      2020 07:  -  2020 11:49  --------------------------------------------------------  IN:    DOPamine Infusion: 95 mL    Insulin: 40 mL    Lactated Ringers: 1000 mL    Midazolam: 9.6 mL    Norepinephrine: 510 mL    Vasopressin: 4.8 mL  Total IN: 1659.4 mL    OUT:    Indwelling Catheter - Urethral (mL): 10 mL  Total OUT: 10 mL    Total NET: 1649.4 mL        Creatine Kinase, Serum: 57851 U/L (20 @ 19:54)  Creatine Kinase, Serum: 335 U/L (20 @ 13:50)    I&O's Summary    2020 07:  -  2020 07:00  --------------------------------------------------------  IN: 6548.9 mL / OUT: 1030 mL / NET: 5518.9 mL    2020 07:01  -  2020 11:49  --------------------------------------------------------  IN: 1659.4 mL / OUT: 10 mL / NET: 1649.4 mL        PHYSICAL EXAM:  Gen: intubated/ventilated  resp: b/l breath sounds  card: S1/S2  abd: distended  Colt    LABS:  Daily Height in cm: 165.1 (2020 18:40)    Daily Weight in k.2 (2020 03:00)  ABG - ( 2020 04:08 )  pH, Arterial: 6.99  pH, Blood: x     /  pCO2: 32    /  pO2: 54    / HCO3: 8     / Base Excess: -23.1 /  SaO2: 85              158<H>  |  118<H>  |  64<HH>  ----------------------------<  543<HH>  4.1   |  13<L>  |  3.6<H>    Ca    7.7<L>      2020 05:46  Phos  8.5       Mg     3.4         TPro  4.6<L>  /  Alb  2.5<L>  /  TBili  0.9  /  DBili      /  AST  1364<H>  /  ALT  296<H>  /  AlkPhos  111      Creatinine Trend:   Creatinine, Serum: 3.6 mg/dL (20 @ 05:46)  Creatinine, Serum: 3.6 mg/dL (20 @ 02:19)  Creatinine, Serum: 3.5 mg/dL (20 @ 23:45)  Creatinine, Serum: 3.4 mg/dL (20 @ 21:51)  Creatinine, Serum: 3.7 mg/dL (20 @ 13:57)                          16.4   22.75 )-----------( 69       ( 2020 05:46 )             47.1     Mean Cell Volume: 90.4 fL (20 @ 05:46)  Mean Cell Volume: 92.7 fL (20 @ 21:51)  Mean Cell Volume: 97.5 fL (20 @ 19:54)    Urine Studies:            RADIOLOGY & ADDITIONAL STUDIES:  < from: Xray Chest 1 View AP/PA (20 @ 06:47) >    EXAM:  XR CHEST 1 VIEW            PROCEDURE DATE:  2020            INTERPRETATION:  CLINICAL INDICATION:  intubated    COMPARISON: Chest radiograph dated 2020    TECHNIQUE: Frontal radiograph the chest.    FINDINGS:    Support devices: ET tube terminates within the right mainstem bronchus. Enteric tube courses below the left hemidiaphragm.    Cardiac/mediastinum/hilum: Stable.    Lung parenchyma/Pleura: 1.3 cm right midlung nodular opacity. No focal consolidation. There is no pneumothorax.    Skeleton/soft tissues: Stable.    IMPRESSION:    ET tube terminates within the right bronchus. Retraction is recommended.    1.3 cm right midlung nodular opacity, which may be artifactual. Attention on follow-up examination is recommended.    Spoke with LORRAINE HOPE on 2020 8:35 AM with readback.      JOCELINE CAMEJO MD; Attending Radiologist  This document has been electronically signed. 2020  8:35AM    < end of copied text >

## 2020-11-23 NOTE — CONSULT NOTE ADULT - ASSESSMENT
Patient now mild sedation. Not responsive on vent. Patient acidotic NAVIN. Would culture. Try correct acidosis. Fluid cautiously. R/O MI. Not primary event. D/C dopamine steroids. Prognosis very poor.

## 2020-11-23 NOTE — CONSULT NOTE ADULT - ASSESSMENT
uncontrolled type 2 diabetes, cardiac arrest, rhabdomyolysis and renal failure and metabolic acidemia.

## 2020-11-23 NOTE — CONSULT NOTE ADULT - SUBJECTIVE AND OBJECTIVE BOX
Reason for Endocrinology Consult: Diabetes    HPI: 52y Female      PAST MEDICAL & SURGICAL HISTORY:  Anxiety    High cholesterol    Sarcoidosis    No significant past surgical history      FAMILY HISTORY:      SH:  Smoking  Etoh:  Recreational Drugs:    Home Medications:  Crestor 5 mg oral tablet: 1 tab(s) orally once a day (at bedtime) (20 May 2018 19:00)  Lunesta 3 mg oral tablet: 1 tab(s) orally once a day (at bedtime) (20 May 2018 19:00)  Xanax 1 mg oral tablet: 1 tab(s) orally 3 times a day (20 May 2018 19:00)      Current (Non-Endocrine) Meds:  acetaminophen  Suppository .. 650 milliGRAM(s) Rectal every 4 hours PRN  chlorhexidine 0.12% Liquid 15 milliLiter(s) Oral Mucosa two times a day  chlorhexidine 4% Liquid 1 Application(s) Topical <User Schedule>  CRRT Treatment    <Continuous>  DOPamine Infusion 35 MICROgram(s)/kG/Min IV Continuous <Continuous>  meropenem  IVPB 500 milliGRAM(s) IV Intermittent every 12 hours  midazolam Infusion 0.02 mG/kG/Hr IV Continuous <Continuous>  norepinephrine Infusion 0.058 MICROgram(s)/kG/Min IV Continuous <Continuous>  norepinephrine Infusion 0.05 MICROgram(s)/kG/Min IV Continuous <Continuous>  pantoprazole  Injectable 40 milliGRAM(s) IV Push two times a day  phenylephrine    Infusion 0.1 MICROgram(s)/kG/Min IV Continuous <Continuous>  PureFlow Dialysate RFP-400 (K 2 / Ca 3) 5000 milliLiter(s) CRRT <Continuous>  sodium bicarbonate  Infusion 0.157 mEq/kG/Hr IV Continuous <Continuous>      Current Endocrine Meds:   hydrocortisone sodium succinate Injectable 50 milliGRAM(s) IV Push every 8 hours  insulin regular Infusion 10 Unit(s)/Hr IV Continuous <Continuous>  vasopressin Infusion 0.667 Unit(s)/Min IV Continuous <Continuous>      Allergies:  Ambien (Other)  codeine (Other)  hydrocodone (Other)      ROS:  Denies the following except as indicated.    General: weight loss/weight gain, decreased appetite, fatigue, fever  Eyes: blurry vision, double vision  ENT: neck swelling, dysphagia, voice changes   CV: palpitations, SOB, chest pain, cough  GI: nausea, vomiting, diarrhea, constipation, abdominal pain  : nocturia,  polyuria, dysuria  Endo: decreased libido, heat/cold intolerance, jitteriness  MSK: arthralgias, myalgias  Skin: rash, dryness, diaphoresis  Neuro: pedal numbness,pedal paresthesias, pedal pain    Height (cm): 165.1 (11-22 @ 18:40)  Weight (kg): 71.5 (11-22 @ 18:40)  BMI (kg/m2): 26.2 (11-22 @ 18:40)    Vital Signs Last 24 Hrs  T(C): 36.7 (23 Nov 2020 19:30), Max: 38.9 (23 Nov 2020 08:00)  T(F): 98.1 (23 Nov 2020 19:30), Max: 102 (23 Nov 2020 08:00)  HR: 90 (23 Nov 2020 19:30) (90 - 125)  BP: 119/86 (23 Nov 2020 15:00) (92/62 - 178/115)  BP(mean): 99 (23 Nov 2020 15:00) (72 - 138)  RR: 30 (23 Nov 2020 19:30) (20 - 33)  SpO2: 90% (23 Nov 2020 18:36) (62% - 98%)  Constitutional: WN/WD in NAD.   Neck: no thyromegaly or palpable thyroid nodules   Respiratory: lungs CTAB.  Cardiovascular: regular rate and rhythm, normal S1 and S2, no audible murmurs  GI: soft, NT/ND, no masses/HSM appreciated.  Ext: no edema, no ulcers, pedal pulses palpable bilaterally  Neurology: no tremor, monofilament sensation intact in feet  Psychiatric: A&O x 3, normal affect/mood.        LABS:                        16.4   22.75 )-----------( 69       ( 23 Nov 2020 05:46 )             47.1     11-23    160<H>  |  125<H>  |  61<HH>  ----------------------------<  174<H>  5.6<H>   |  8<LL>  |  4.0<H>    Ca    7.2<L>      23 Nov 2020 11:30  Phos  9.8     11-23  Mg     3.1     11-23    TPro  4.3<L>  /  Alb  2.2<L>  /  TBili  1.1  /  DBili  x   /  AST  >700<H>  /  ALT  314<H>  /  AlkPhos  108  11-23    PT/INR - ( 22 Nov 2020 13:57 )   PT: 13.20 sec;   INR: 1.15 ratio         PTT - ( 22 Nov 2020 13:57 )  PTT:33.5 sec                                   RADIOLOGY & ADDITIONAL STUDIES:    A/P:52yFemale

## 2020-11-23 NOTE — PROGRESS NOTE ADULT - SUBJECTIVE AND OBJECTIVE BOX
SUBJECTIVE:    Patient is a 52y old Female who presents with a chief complaint of cardiac arrest (23 Nov 2020 13:49)    Currently admitted to medicine with the primary diagnosis of Cardiac arrest       Today is hospital day 1d. This morning patient is unresponsive to stimuli off sedation.     PAST MEDICAL & SURGICAL HISTORY  Anxiety    High cholesterol    Sarcoidosis    No significant past surgical history      SOCIAL HISTORY:  Negative for smoking/alcohol/drug use.     ALLERGIES:  Ambien (Other)  codeine (Other)  hydrocodone (Other)    MEDICATIONS:  STANDING MEDICATIONS  chlorhexidine 0.12% Liquid 15 milliLiter(s) Oral Mucosa two times a day  chlorhexidine 4% Liquid 1 Application(s) Topical <User Schedule>  CRRT Treatment    <Continuous>  dextrose 5%. 1000 milliLiter(s) IV Continuous <Continuous>  DOPamine Infusion 35 MICROgram(s)/kG/Min IV Continuous <Continuous>  hydrocortisone sodium succinate Injectable 50 milliGRAM(s) IV Push every 8 hours  insulin regular Infusion 10 Unit(s)/Hr IV Continuous <Continuous>  lactated ringers Bolus 250 milliLiter(s) IV Bolus once  meropenem  IVPB 500 milliGRAM(s) IV Intermittent every 12 hours  midazolam Infusion 0.02 mG/kG/Hr IV Continuous <Continuous>  norepinephrine Infusion 0.05 MICROgram(s)/kG/Min IV Continuous <Continuous>  norepinephrine Infusion 0.05 MICROgram(s)/kG/Min IV Continuous <Continuous>  pantoprazole  Injectable 40 milliGRAM(s) IV Push two times a day  PureFlow Dialysate RFP-400 (K 2 / Ca 3) 5000 milliLiter(s) CRRT <Continuous>  sodium bicarbonate  Infusion 0.157 mEq/kG/Hr IV Continuous <Continuous>  vasopressin Infusion 0.04 Unit(s)/Min IV Continuous <Continuous>    PRN MEDICATIONS  acetaminophen  Suppository .. 650 milliGRAM(s) Rectal every 4 hours PRN    VITALS:   T(F): 101.8  HR: 123  BP: 124/87  RR: 30  SpO2: 88%    LABS:                        16.4   22.75 )-----------( 69       ( 23 Nov 2020 05:46 )             47.1     11-23    160<H>  |  125<H>  |  61<HH>  ----------------------------<  174<H>  5.6<H>   |  8<LL>  |  4.0<H>    Ca    7.2<L>      23 Nov 2020 11:30  Phos  9.8     11-23  Mg     3.1     11-23    TPro  4.3<L>  /  Alb  2.2<L>  /  TBili  1.1  /  DBili  x   /  AST  >700<H>  /  ALT  314<H>  /  AlkPhos  108  11-23    PT/INR - ( 22 Nov 2020 13:57 )   PT: 13.20 sec;   INR: 1.15 ratio         PTT - ( 22 Nov 2020 13:57 )  PTT:33.5 sec    ABG - ( 23 Nov 2020 04:08 )  pH, Arterial: 6.99  pH, Blood: x     /  pCO2: 32    /  pO2: 54    / HCO3: 8     / Base Excess: -23.1 /  SaO2: 85                Creatine Kinase, Serum: 1760 U/L <H> (11-23-20 @ 11:30)  Troponin T, Serum: 0.67 ng/mL <HH> (11-23-20 @ 11:30)  Lactate, Blood: 10.5 mmol/L <HH> (11-23-20 @ 11:30)  Lactate, Blood: 13.2 mmol/L <HH> (11-23-20 @ 05:46)  Creatine Kinase, Serum: 91263 U/L <H> (11-22-20 @ 19:54)  Lactate, Blood: 9.5 mmol/L <HH> (11-22-20 @ 19:54)      CARDIAC MARKERS ( 23 Nov 2020 11:30 )  x     / 0.67 ng/mL / 1760 U/L / x     / x      CARDIAC MARKERS ( 22 Nov 2020 19:54 )  x     / x     / 60596 U/L / x     / x      CARDIAC MARKERS ( 22 Nov 2020 13:57 )  x     / <0.01 ng/mL / x     / x     / x      CARDIAC MARKERS ( 22 Nov 2020 13:50 )  x     / x     / 335 U/L / x     / x          RADIOLOGY:    PHYSICAL EXAM:  LUNGS: Clear to auscultation bilaterally   HEART: S1/S2 present. RRR.   ABD: Soft, non-tender, non-distended. Bowel sounds present  EXT: NC/NC/NE/2+PP/MARTINES  NEURO: No response to painful stimuli, sternal rub. Pupils dilated and, non-reactive

## 2020-11-24 NOTE — PROGRESS NOTE ADULT - ASSESSMENT
53 y/o F with PMH of GI issue as per EMS, presents unresponsive from home on the bathroom floor PTA. Pt was last spoken to last night and was in nl state of health as per ems. Yesterday  after she didn't answer the phone, her friend went over and found her unresponsive on the bathroom floor; unsure how long she was down for. No evidence of drugs or alcohol. EMS was called and gave 2 of NARCAN on route. On arrival pt with oral airway in place with shallow respirations. No BP recorded on route, pupils fixed and dilated, pt george to 50s, FS too high to measure. Pt was intubated for airway protection and anticipated clinical course. Coffee ground emesis noted in OP during intubation; intubated without medication and with direct visualization and color change. At this time pt was started on Levophed and a R femoral line was placed. After tubed, no pulse appreciated and CPR was initiated, initial rhythm PEA. Multiple rounds of Epi given with BiCARB. Pt began pouring coffee ground blood from mouth, given TXA and 2 packs of RBC for possible GI bleed. Pt converted to Vfib, shocked 3x to 200 Joules, given Epi and Mg with ROSC, noted to be george to the 40s, given Atropine and restarted on Levophed and Dopamine to add BP support. OG tube placed. Pt seized, given 2mg Ativan. Code lasting approx 45 minutes, Insulin drip started for treatment of DKA    acute respiratory failure / cardiac arrest - possible type 2 NSTEMI - ventricular fibrillation  /  / DKA - severe metabolic acidosis / acute kidney injury / shock liver / septic vs cardiogenic shock (POA) / hypernatremia       - continue IV fluids and bicarb    -  IV Insulin drip - check hourly fs   - Initiate CVVH as per nephrology   - check eeg, maintain magnesium above 2, neurology consult   - hold sedation and follow mental status   - continue pressors and maintain MAP >60    - broad spectrum antibiotics, check cultures   - Solu-cortef    - DVT and GI prophylaxis    - grave prognosis, family aware -> DNR --> pt  and family notified

## 2020-11-24 NOTE — PROGRESS NOTE ADULT - ASSESSMENT
IMPRESSION:  Septic shock vs cardiogenic shock  Acute respiratory failure  Cardiac arrest  Severe metabolic acidosis  Rhabdomyolysis  Hypernatremia - resolving  HHS  NAVIN on CVVH  HO Sarcoidosis    SUGGEST:      CNS: Avoid sedation    HEENT: Oral care    PULMONARY:  HOB @ 45 degrees.  Vent changes as follows: PEEP 12    CARDIOVASCULAR: Continue levophed and vaso and joselyn Target MAP 65,   Start bicarb at 100cc/hr   Maintain even slightly positive balance  GI: GI prophylaxis.  OGT to suction    RENAL:  Follow up lytes.  Correct as needed. Nephrology following. Continue CVVH    INFECTIOUS DISEASE: Follow up cultures. Continue meropenem, load vancomycin, check level in AM    HEMATOLOGICAL:  DVT prophylaxis.    ENDOCRINE:  Follow up FS.  Insulin drip    MUSCULOSKELETAL: Bedrest    Overall prognosis grave for neurologic recovery.    DNR

## 2020-11-24 NOTE — DISCHARGE NOTE FOR THE EXPIRED PATIENT - HOSPITAL COURSE
52 year old female with PMH of hyperlipidemia, Sarcoidosis?, and anxiety BIBEMS after she was found unresponsive in the floor for unknown duration.   per EMS, presents unresponsive from home on the bathroom floor PTA. Pt was last spoken to last night and was in normal state of health. This morning after she didn't answer the phone, her friend went over and found her unresponsive on the bathroom floor; unsure how long she was down for. No evidence of drugs or alcohol. EMS was called and gave 2 of NARCAN on route. On arrival pt with oral airway in place with shallow respirations. No BP recorded on route, pupils fixed and dilated, pt george to 50s, FS too high to measure. Pt was intubated for airway protection and anticipated clinical course. Coffee ground emesis noted in OP during intubation; intubated without medication and with direct visualization and color change. At this time pt was started on Levophed and a R femoral line was placed. After tubed, no pulse appreciated and CPR was initiated, initial rhythm PA. Multiple rounds of Eppy given with BiCARB. Pt began pouring coffee ground blood from mouth, given TXA and 2 packs of RBC for possible GI bleed. Pt converted to Vfib, shocked 3x to 200, given Eppy and Mg with ROSC, noted to be george to the 40s, given Atropine and restarted on Levophed and Dopamine to add BP support. OG tube placed. Pt seized, given 2mg Ativan.   In ED patient was found to be in DKA/HHS picture with NAVIN, she moves her extremities and responds to painful stimuli after cardiac arrest.     In intensive care unit, patient with worsening acidemia, neurological exam showed no response to painful stimuli, pupils non reactive. At 10 Am on 10/24, patient heart rate started to drop, arterial line with no signal, found pulseless at 10:16

## 2020-11-24 NOTE — PROGRESS NOTE ADULT - ASSESSMENT
Patient on vent on fio2 100%. Not sedated . Not responsive. On CVVH. Hypothermic. Echo ef about 55%. Hypoglycemic. On several pressors. Need support But suspect significant anoxic encephalopathy. Now DNR. Prognosis very poor.

## 2020-11-24 NOTE — PHARMACOTHERAPY INTERVENTION NOTE - COMMENTS
Brought to MD's attention that pt was on high NE dose of 2mcg/kg/min (equal to 140 mcg/min), with relatively low phenylephrine (1mcg/kg/min equal to 70 mcg/min). Rec to titrate down on NE and go up on phenyl. Also on vaso 0.06 units/min instead of 0.04 units/min
Notified MD of jump  in Na from 136 on yesterday 11/22 1400 to 151 this AM (about 15mEq jump in Na within 24 hours). No active fluid running at this time.
Rec to DC D10 as pt sugar has been elevated to >600
Rec to increase wojciech to CRRT dosing 1g iv q8hr

## 2020-11-24 NOTE — CHART NOTE - NSCHARTNOTEFT_GEN_A_CORE
Consult placed for brain death evaluation however brain death protocol cannot be initiated as the REEG shows the presence of slow/suppressed cortical activity.   Spoke with housestaff  Please call with any questions

## 2020-11-24 NOTE — PROGRESS NOTE ADULT - SUBJECTIVE AND OBJECTIVE BOX
Patient seen and evaluated this am      T(F): 95 (11-24-20 @ 10:10), Max: 101.5 (11-23-20 @ 15:00)  HR: 47 (11-24-20 @ 10:04)  BP: 119/86 (11-23-20 @ 15:00)  RR: 30 (11-24-20 @ 07:01)  SpO2: 90% (11-23-20 @ 18:36) (88% - 93%)    PHYSICAL EXAM:  GENERAL: NAD  HEAD:  Atraumatic, Normocephalic  NERVOUS SYSTEM:  no gag  CHEST/LUNG: b/l rales   HEART: Regular rate and rhythm; No murmurs, rubs, or gallops  ABDOMEN: Soft, Nontender, Nondistended; Bowel sounds present  EXTREMITIES:  2+ Peripheral Pulses, No clubbing, cyanosis, or edema  LYMPH: No lymphadenopathy noted  SKIN: No rashes or lesions    LABS  11-24    132<L>  |  99  |  30<H>  ----------------------------<  766<HH>  8.4<HH>   |  5<LL>  |  2.7<H>    Ca    5.5<LL>      24 Nov 2020 09:00  Phos  11.0     11-24  Mg     2.3     11-24    TPro  2.8<L>  /  Alb  1.4<L>  /  TBili  2.5<H>  /  DBili  x   /  AST  5   /  ALT  >700<H>  /  AlkPhos  173<H>  11-24                          11.9   21.38 )-----------( 28       ( 24 Nov 2020 05:30 )             35.7     PT/INR - ( 22 Nov 2020 13:57 )   PT: 13.20 sec;   INR: 1.15 ratio         PTT - ( 22 Nov 2020 13:57 )  PTT:33.5 sec  Mode: Auto Mode: PRVC/ Volume Support  RR (machine): 30  TV (machine): 450  FiO2: 100  PEEP: 10  MAP: 15  PIP: 26    CARDIAC ENZYMES  Creatine Kinase, Serum: 1760 (11-23 @ 11:30)  Creatine Kinase, Serum: 20418 (11-22 @ 19:54)  Creatine Kinase, Serum: 335 (11-22 @ 13:50)      Troponin T, Serum: 0.67 ng/mL (11-23-20 @ 11:30)  Troponin T, Serum: <0.01 ng/mL (11-22-20 @ 13:57)      Culture Results:   <10,000 CFU/mL Normal Urogenital Ana Maria (11-23-20)  Culture Results:   No growth to date. (11-22-20)  Culture Results:   No growth to date. (11-22-20)    RADIOLOGY  < from: Xray Chest 1 View- PORTABLE-Routine (Xray Chest 1 View- PORTABLE-Routine in AM.) (11.24.20 @ 10:20) >    No radiographic evidence of acute cardiopulmonary disease.      < end of copied text >    MEDICATIONS  (STANDING):  chlorhexidine 0.12% Liquid 15 milliLiter(s) Oral Mucosa two times a day  chlorhexidine 4% Liquid 1 Application(s) Topical <User Schedule>  CRRT Treatment    <Continuous>  dextrose 5% 1000 milliLiter(s) (100 mL/Hr) IV Continuous <Continuous>  hydrocortisone sodium succinate Injectable 100 milliGRAM(s) IV Push every 8 hours  insulin regular Infusion 10 Unit(s)/Hr (10 mL/Hr) IV Continuous <Continuous>  meropenem  IVPB 500 milliGRAM(s) IV Intermittent every 12 hours  norepinephrine Infusion 0.058 MICROgram(s)/kG/Min (3.91 mL/Hr) IV Continuous <Continuous>  norepinephrine Infusion 0.05 MICROgram(s)/kG/Min (3.52 mL/Hr) IV Continuous <Continuous>  pantoprazole  Injectable 40 milliGRAM(s) IV Push two times a day  phenylephrine    Infusion 0.1 MICROgram(s)/kG/Min (1.34 mL/Hr) IV Continuous <Continuous>  PureFlow Dialysate RFP-400 (K 2 / Ca 3) 5000 milliLiter(s) (2000 mL/Hr) CRRT <Continuous>  vasopressin Infusion 0.04 Unit(s)/Min (2.4 mL/Hr) IV Continuous <Continuous>    MEDICATIONS  (PRN):  acetaminophen  Suppository .. 650 milliGRAM(s) Rectal every 4 hours PRN Temp greater or equal to 38.5C (101.3F)  dextrose 50% Injectable 100 milliLiter(s) IV Push every 15 minutes PRN fs <60

## 2020-11-24 NOTE — PROGRESS NOTE ADULT - SUBJECTIVE AND OBJECTIVE BOX
Patient is a 52y old  Female who presents with a chief complaint of cardiac arrest (24 Nov 2020 07:17)        Over Night Events: CCVh stopped 730 due to malfunction, remains on MV remains on levophed, vasopressin, joselyn-synephrine not sedated.         ROS:  See HPI    PHYSICAL EXAM    ICU Vital Signs Last 24 Hrs  T(C): 34.4 (24 Nov 2020 07:01), Max: 38.8 (23 Nov 2020 11:00)  T(F): 93.9 (24 Nov 2020 07:01), Max: 101.8 (23 Nov 2020 11:00)  HR: 73 (24 Nov 2020 08:29) (69 - 125)  BP: 119/86 (23 Nov 2020 15:00) (119/86 - 119/86)  BP(mean): 99 (23 Nov 2020 15:00) (99 - 99)  ABP: 162/34 (24 Nov 2020 07:00) (72/37 - 183/52)  ABP(mean): 56 (24 Nov 2020 07:00) (44 - 80)  RR: 30 (24 Nov 2020 07:01) (30 - 30)  SpO2: 90% (23 Nov 2020 18:36) (76% - 94%)      General: NAD  HEENT: + ETT             Lymph Nodes: No cervical LN   Lungs: Bilateral BS  Cardiovascular: Regular   Abdomen: Soft, Positive BS  Extremities: No clubbing   Skin: Warm  Neurological: no gag no corneal, unresponsive to pain      11-23-20 @ 07:01  -  11-24-20 @ 07:00  --------------------------------------------------------  IN:    dextrose 10%: 600 mL    dextrose 5%: 300 mL    dextrose 5% + sodium chloride 0.45%: 500 mL    dextrose 5% + sodium chloride 0.9%: 50 mL    dextrose 5% w/ Additives: 450 mL    DOPamine Infusion: 672.4 mL    Insulin: 4 mL    Insulin: 87 mL    Insulin: 40 mL    IV PiggyBack: 250 mL    Lactated Ringers: 1000 mL    Lactated Ringers Bolus: 250 mL    Midazolam: 9.6 mL    multiple electrolytes Injection Type 1 Bolus: 450 mL    Norepinephrine: 4396 mL    Phenylephrine: 389 mL    Sodium Bicarbonate: 450 mL    Sodium Bicarbonate: 525 mL    Vasopressin: 34.4 mL    Vasopressin: 42.4 mL  Total IN: 97877.8 mL    OUT:    Indwelling Catheter - Urethral (mL): 10 mL    Nasogastric/Oral tube (mL): 400 mL    Other (mL): 3050 mL  Total OUT: 3460 mL    Total NET: 7039.8 mL      11-24-20 @ 07:01  -  11-24-20 @ 09:15  --------------------------------------------------------  IN:  Total IN: 0 mL    OUT:    Indwelling Catheter - Urethral (mL): 5 mL  Total OUT: 5 mL    Total NET: -5 mL          LABS:                            11.9   21.38 )-----------( 28       ( 24 Nov 2020 05:30 )             35.7                                               11-24    133<L>  |  98  |  29<H>  ----------------------------<  tnp  tnp   |  tnp  |  2.6<H>    Ca    tnp      24 Nov 2020 08:10  Phos  11.0     11-24  Mg     2.3     11-24    TPro  2.8<L>  /  Alb  1.4<L>  /  TBili  2.5<H>  /  DBili  x   /  AST  5   /  ALT  >700<H>  /  AlkPhos  173<H>  11-24      PT/INR - ( 22 Nov 2020 13:57 )   PT: 13.20 sec;   INR: 1.15 ratio         PTT - ( 22 Nov 2020 13:57 )  PTT:33.5 sec                                           CARDIAC MARKERS ( 23 Nov 2020 11:30 )  x     / 0.67 ng/mL / 1760 U/L / x     / x      CARDIAC MARKERS ( 22 Nov 2020 19:54 )  x     / x     / 80630 U/L / x     / x      CARDIAC MARKERS ( 22 Nov 2020 13:57 )  x     / <0.01 ng/mL / x     / x     / x      CARDIAC MARKERS ( 22 Nov 2020 13:50 )  x     / x     / 335 U/L / x     / x        LIVER FUNCTIONS - ( 24 Nov 2020 05:30 )  Alb: 1.4 g/dL / Pro: 2.8 g/dL / ALK PHOS: 173 U/L / ALT: >700 U/L / AST: 5 U/L / GGT: x                                                  Culture - Blood (collected 22 Nov 2020 19:54)  Source: .Blood None  Preliminary Report (24 Nov 2020 02:11):    No growth to date.    Culture - Blood (collected 22 Nov 2020 19:54)  Source: .Blood None  Preliminary Report (24 Nov 2020 02:11):    No growth to date.                                                   Mode: Auto Mode: PRVC/ Volume Support  RR (machine): 30  TV (machine): 450  FiO2: 100  PEEP: 10  MAP: 15  PIP: 26                                      ABG - ( 24 Nov 2020 05:47 )  pH, Arterial: 6.92  pH, Blood: x     /  pCO2: 34    /  pO2: 88    / HCO3: 7     / Base Excess: -24.6 /  SaO2: 95                  MEDICATIONS  (STANDING):  chlorhexidine 0.12% Liquid 15 milliLiter(s) Oral Mucosa two times a day  chlorhexidine 4% Liquid 1 Application(s) Topical <User Schedule>  CRRT Treatment    <Continuous>  dextrose 10%. 1000 milliLiter(s) (100 mL/Hr) IV Continuous <Continuous>  dextrose 5% 1000 milliLiter(s) (75 mL/Hr) IV Continuous <Continuous>  dextrose 5% + sodium chloride 0.45%. 1000 milliLiter(s) (100 mL/Hr) IV Continuous <Continuous>  hydrocortisone sodium succinate Injectable 100 milliGRAM(s) IV Push every 8 hours  insulin regular Infusion 10 Unit(s)/Hr (10 mL/Hr) IV Continuous <Continuous>  meropenem  IVPB 500 milliGRAM(s) IV Intermittent every 12 hours  norepinephrine Infusion 0.058 MICROgram(s)/kG/Min (3.91 mL/Hr) IV Continuous <Continuous>  norepinephrine Infusion 0.05 MICROgram(s)/kG/Min (3.52 mL/Hr) IV Continuous <Continuous>  pantoprazole  Injectable 40 milliGRAM(s) IV Push two times a day  phenylephrine    Infusion 0.1 MICROgram(s)/kG/Min (1.34 mL/Hr) IV Continuous <Continuous>  PureFlow Dialysate RFP-400 (K 2 / Ca 3) 5000 milliLiter(s) (2000 mL/Hr) CRRT <Continuous>  vancomycin  IVPB 1500 milliGRAM(s) IV Intermittent once  vasopressin Infusion 0.04 Unit(s)/Min (2.4 mL/Hr) IV Continuous <Continuous>    MEDICATIONS  (PRN):  acetaminophen  Suppository .. 650 milliGRAM(s) Rectal every 4 hours PRN Temp greater or equal to 38.5C (101.3F)  dextrose 50% Injectable 100 milliLiter(s) IV Push every 15 minutes PRN fs <60      Xrays:                                                                                     ECHO

## 2020-11-24 NOTE — PROGRESS NOTE ADULT - SUBJECTIVE AND OBJECTIVE BOX
Patient is a 52y old  Female who presents with a chief complaint of cardiac arrest (23 Nov 2020 20:16)      T(F): 93.9 (11-24-20 @ 07:01), Max: 102 (11-23-20 @ 08:00)  HR: 81 (11-24-20 @ 07:00)  BP: 119/86 (11-23-20 @ 15:00)  RR: 30 (11-24-20 @ 07:01)  SpO2: 90% (11-23-20 @ 18:36) (76% - 94%)    PHYSICAL EXAM:  GENERAL: NAD, well-groomed, well-developed  HEAD:  Atraumatic, Normocephalic  EYES: EOMI, PERRLA, conjunctiva and sclera clear  ENMT: No tonsillar erythema, exudates, or enlargement; Moist mucous membranes, Good dentition, No lesions  NECK: Supple, No JVD, Normal thyroid  NERVOUS SYSTEM:  Alert & Oriented X3,  Motor Strength 5/5 B/L upper and lower extremities  CHEST/LUNG: Clear to percussion bilaterally; No rales, rhonchi, wheezing, or rubs  HEART: Regular rate and rhythm; No murmurs, rubs, or gallops  ABDOMEN: Soft, Nontender, Nondistended; Bowel sounds present  EXTREMITIES:   No clubbing, cyanosis, or edema  LYMPH: No lymphadenopathy noted  SKIN: No rashes or lesions    labs  11-23    151<H>  |  118<H>  |  44<H>  ----------------------------<  127<H>  6.3<HH>   |  9<LL>  |  3.1<H>    Ca    6.5<L>      23 Nov 2020 19:44  Phos  10.3     11-23  Mg     2.6     11-23    TPro  3.7<L>  /  Alb  1.9<L>  /  TBili  1.8<H>  /  DBili  x   /  AST  2170<H>  /  ALT  466<H>  /  AlkPhos  126<H>  11-23                          16.4   22.75 )-----------( 69       ( 23 Nov 2020 05:46 )             47.1       Culture - Blood (collected 22 Nov 2020 19:54)  Source: .Blood None  Preliminary Report (24 Nov 2020 02:11):    No growth to date.    Culture - Blood (collected 22 Nov 2020 19:54)  Source: .Blood None  Preliminary Report (24 Nov 2020 02:11):    No growth to date.      PT/INR - ( 22 Nov 2020 13:57 )   PT: 13.20 sec;   INR: 1.15 ratio         PTT - ( 22 Nov 2020 13:57 )  PTT:33.5 sec  Mode: Auto Mode: PRVC/ Volume Support  RR (machine): 30  TV (machine): 450  FiO2: 100  PEEP: 10  MAP: 16  PIP: 29    Creatine Kinase, Serum: 1760 U/L <H> (11-23-20 @ 11:30)  Troponin T, Serum: 0.67 ng/mL <HH> (11-23-20 @ 11:30)      acetaminophen  Suppository .. 650 milliGRAM(s) Rectal every 4 hours PRN  chlorhexidine 0.12% Liquid 15 milliLiter(s) Oral Mucosa two times a day  chlorhexidine 4% Liquid 1 Application(s) Topical <User Schedule>  CRRT Treatment    <Continuous>  dextrose 10%. 1000 milliLiter(s) IV Continuous <Continuous>  dextrose 5% 1000 milliLiter(s) IV Continuous <Continuous>  dextrose 5% + sodium chloride 0.45%. 1000 milliLiter(s) IV Continuous <Continuous>  dextrose 50% Injectable 100 milliLiter(s) IV Push every 15 minutes PRN  DOPamine Infusion 35 MICROgram(s)/kG/Min IV Continuous <Continuous>  hydrocortisone sodium succinate Injectable 50 milliGRAM(s) IV Push every 8 hours  meropenem  IVPB 500 milliGRAM(s) IV Intermittent every 12 hours  midazolam Infusion 0.02 mG/kG/Hr IV Continuous <Continuous>  norepinephrine Infusion 0.058 MICROgram(s)/kG/Min IV Continuous <Continuous>  norepinephrine Infusion 0.05 MICROgram(s)/kG/Min IV Continuous <Continuous>  pantoprazole  Injectable 40 milliGRAM(s) IV Push two times a day  phenylephrine    Infusion 0.1 MICROgram(s)/kG/Min IV Continuous <Continuous>  PureFlow Dialysate RFP-400 (K 2 / Ca 3) 5000 milliLiter(s) CRRT <Continuous>  vancomycin  IVPB 1500 milliGRAM(s) IV Intermittent once  vasopressin Infusion 0.04 Unit(s)/Min IV Continuous <Continuous>

## 2020-11-26 LAB — GRAM STN FLD: SIGNIFICANT CHANGE UP

## 2020-11-27 LAB
-  CANDIDA ALBICANS: SIGNIFICANT CHANGE UP
METHOD TYPE: SIGNIFICANT CHANGE UP

## 2020-11-28 LAB
CULTURE RESULTS: SIGNIFICANT CHANGE UP
SPECIMEN SOURCE: SIGNIFICANT CHANGE UP

## 2020-11-29 LAB
-  FLUCONAZOLE: SIGNIFICANT CHANGE UP
-  INTERPRETATION: SIGNIFICANT CHANGE UP
CULTURE RESULTS: SIGNIFICANT CHANGE UP
METHOD TYPE: SIGNIFICANT CHANGE UP
ORGANISM # SPEC MICROSCOPIC CNT: SIGNIFICANT CHANGE UP
SPECIMEN SOURCE: SIGNIFICANT CHANGE UP

## 2023-07-20 NOTE — ED PROVIDER NOTE - CROS ED CARDIOVAS ALL NEG
----- Message from Maggie Cline PA-C sent at 7/20/2023 12:54 PM CDT -----  - Please notify Melva that her X-ray is negative. Mild amount of stool in the colon, no excess stool burden to suggest constipation with overflow diarrhea.  - Continue to recommend daily fiber supplement to help bulk stool.  - Increase fiber and water in diet as well.  - Please have her provide me with a symptom update on how she is doing in 4-6 weeks after above recommendations and if symptoms persist would recommend EGD/Colonoscopy.    Thanks,  Maggie Cline PA-C     negative...

## 2023-08-22 NOTE — ED ADULT NURSE NOTE - NSIMPLEMENTINTERV_GEN_ALL_ED
Implemented All Fall Risk Interventions:  [place] to call system. Call bell, personal items and telephone within reach. Instruct patient to call for assistance. Room bathroom lighting operational. Non-slip footwear when patient is off stretcher. Physically safe environment: no spills, clutter or unnecessary equipment. Stretcher in lowest position, wheels locked, appropriate side rails in place. Provide visual cue, wrist band, yellow gown, etc. Monitor gait and stability. Monitor for mental status changes and reorient to person, place, and time. Review medications for side effects contributing to fall risk. Reinforce activity limits and safety measures with patient and family. 18:29